# Patient Record
Sex: MALE | Race: WHITE | Employment: OTHER | URBAN - METROPOLITAN AREA
[De-identification: names, ages, dates, MRNs, and addresses within clinical notes are randomized per-mention and may not be internally consistent; named-entity substitution may affect disease eponyms.]

---

## 2018-09-26 ENCOUNTER — TELEPHONE (OUTPATIENT)
Dept: UROLOGY | Age: 83
End: 2018-09-26

## 2021-02-13 LAB — SARS-COV-2: DETECTED

## 2021-03-10 ENCOUNTER — APPOINTMENT (OUTPATIENT)
Dept: CT IMAGING | Age: 86
DRG: 299 | End: 2021-03-10
Payer: MEDICARE

## 2021-03-10 ENCOUNTER — APPOINTMENT (OUTPATIENT)
Dept: GENERAL RADIOLOGY | Age: 86
DRG: 299 | End: 2021-03-10
Payer: MEDICARE

## 2021-03-10 ENCOUNTER — HOSPITAL ENCOUNTER (INPATIENT)
Age: 86
LOS: 1 days | Discharge: ANOTHER HOSPITAL | DRG: 299 | End: 2021-03-11
Attending: EMERGENCY MEDICINE | Admitting: INTERNAL MEDICINE
Payer: MEDICARE

## 2021-03-10 DIAGNOSIS — I48.91 ATRIAL FIBRILLATION WITH RAPID VENTRICULAR RESPONSE (HCC): ICD-10-CM

## 2021-03-10 DIAGNOSIS — N39.0 URINARY TRACT INFECTION IN MALE: ICD-10-CM

## 2021-03-10 DIAGNOSIS — I73.9 PVD (PERIPHERAL VASCULAR DISEASE) (HCC): Primary | ICD-10-CM

## 2021-03-10 DIAGNOSIS — I99.8 ISCHEMIA OF LEFT LOWER EXTREMITY: ICD-10-CM

## 2021-03-10 LAB
ALBUMIN SERPL-MCNC: 2.8 G/DL (ref 3.5–5.1)
ALP BLD-CCNC: 67 U/L (ref 38–126)
ALT SERPL-CCNC: 8 U/L (ref 11–66)
ANION GAP SERPL CALCULATED.3IONS-SCNC: 11 MEQ/L (ref 8–16)
APTT: 34.2 SECONDS (ref 22–38)
APTT: 36.2 SECONDS (ref 22–38)
AST SERPL-CCNC: 18 U/L (ref 5–40)
BASOPHILS # BLD: 0.2 %
BASOPHILS ABSOLUTE: 0.1 THOU/MM3 (ref 0–0.1)
BILIRUB SERPL-MCNC: 1 MG/DL (ref 0.3–1.2)
BUN BLDV-MCNC: 23 MG/DL (ref 7–22)
CALCIUM SERPL-MCNC: 8.7 MG/DL (ref 8.5–10.5)
CHLORIDE BLD-SCNC: 105 MEQ/L (ref 98–111)
CO2: 19 MEQ/L (ref 23–33)
CREAT SERPL-MCNC: 1.2 MG/DL (ref 0.4–1.2)
EKG ATRIAL RATE: 110 BPM
EKG Q-T INTERVAL: 120 MS
EKG QRS DURATION: 38 MS
EKG QTC CALCULATION (BAZETT): 212 MS
EKG R AXIS: 0 DEGREES
EKG T AXIS: -107 DEGREES
EKG VENTRICULAR RATE: 189 BPM
EOSINOPHIL # BLD: 0.1 %
EOSINOPHILS ABSOLUTE: 0 THOU/MM3 (ref 0–0.4)
ERYTHROCYTE [DISTWIDTH] IN BLOOD BY AUTOMATED COUNT: 15 % (ref 11.5–14.5)
ERYTHROCYTE [DISTWIDTH] IN BLOOD BY AUTOMATED COUNT: 15 % (ref 11.5–14.5)
ERYTHROCYTE [DISTWIDTH] IN BLOOD BY AUTOMATED COUNT: 51.8 FL (ref 35–45)
ERYTHROCYTE [DISTWIDTH] IN BLOOD BY AUTOMATED COUNT: 53 FL (ref 35–45)
GFR SERPL CREATININE-BSD FRML MDRD: 57 ML/MIN/1.73M2
GLUCOSE BLD-MCNC: 133 MG/DL (ref 70–108)
HCT VFR BLD CALC: 37.9 % (ref 42–52)
HCT VFR BLD CALC: 39.3 % (ref 42–52)
HEMOGLOBIN: 11.7 GM/DL (ref 14–18)
HEMOGLOBIN: 11.9 GM/DL (ref 14–18)
IMMATURE GRANS (ABS): 0.4 THOU/MM3 (ref 0–0.07)
IMMATURE GRANULOCYTES: 1.4 %
INR BLD: 2.51 (ref 0.85–1.13)
LYMPHOCYTES # BLD: 3 %
LYMPHOCYTES ABSOLUTE: 0.9 THOU/MM3 (ref 1–4.8)
MCH RBC QN AUTO: 29.2 PG (ref 26–33)
MCH RBC QN AUTO: 29.3 PG (ref 26–33)
MCHC RBC AUTO-ENTMCNC: 30.3 GM/DL (ref 32.2–35.5)
MCHC RBC AUTO-ENTMCNC: 30.9 GM/DL (ref 32.2–35.5)
MCV RBC AUTO: 94.8 FL (ref 80–94)
MCV RBC AUTO: 96.3 FL (ref 80–94)
MONOCYTES # BLD: 8.4 %
MONOCYTES ABSOLUTE: 2.4 THOU/MM3 (ref 0.4–1.3)
NUCLEATED RED BLOOD CELLS: 0 /100 WBC
OSMOLALITY CALCULATION: 275.7 MOSMOL/KG (ref 275–300)
PLATELET # BLD: 191 THOU/MM3 (ref 130–400)
PLATELET # BLD: 199 THOU/MM3 (ref 130–400)
PLATELET ESTIMATE: ADEQUATE
PMV BLD AUTO: 9.6 FL (ref 9.4–12.4)
PMV BLD AUTO: 9.7 FL (ref 9.4–12.4)
POTASSIUM REFLEX MAGNESIUM: 5.5 MEQ/L (ref 3.5–5.2)
PRO-BNP: ABNORMAL PG/ML (ref 0–1800)
RBC # BLD: 4 MILL/MM3 (ref 4.7–6.1)
RBC # BLD: 4.08 MILL/MM3 (ref 4.7–6.1)
SCAN OF BLOOD SMEAR: NORMAL
SEG NEUTROPHILS: 86.9 %
SEGMENTED NEUTROPHILS ABSOLUTE COUNT: 24.8 THOU/MM3 (ref 1.8–7.7)
SODIUM BLD-SCNC: 135 MEQ/L (ref 135–145)
TOTAL PROTEIN: 7.3 G/DL (ref 6.1–8)
TROPONIN T: < 0.01 NG/ML
WBC # BLD: 25.1 THOU/MM3 (ref 4.8–10.8)
WBC # BLD: 28.5 THOU/MM3 (ref 4.8–10.8)

## 2021-03-10 PROCEDURE — 93005 ELECTROCARDIOGRAM TRACING: CPT | Performed by: EMERGENCY MEDICINE

## 2021-03-10 PROCEDURE — 6360000004 HC RX CONTRAST MEDICATION: Performed by: EMERGENCY MEDICINE

## 2021-03-10 PROCEDURE — 85730 THROMBOPLASTIN TIME PARTIAL: CPT

## 2021-03-10 PROCEDURE — 96360 HYDRATION IV INFUSION INIT: CPT

## 2021-03-10 PROCEDURE — 2580000003 HC RX 258: Performed by: NURSE PRACTITIONER

## 2021-03-10 PROCEDURE — 6370000000 HC RX 637 (ALT 250 FOR IP): Performed by: NURSE PRACTITIONER

## 2021-03-10 PROCEDURE — 2580000003 HC RX 258: Performed by: EMERGENCY MEDICINE

## 2021-03-10 PROCEDURE — 99449 NTRPROF PH1/NTRNET/EHR 31/>: CPT | Performed by: PSYCHIATRY & NEUROLOGY

## 2021-03-10 PROCEDURE — 85027 COMPLETE CBC AUTOMATED: CPT

## 2021-03-10 PROCEDURE — 1200000003 HC TELEMETRY R&B

## 2021-03-10 PROCEDURE — 80053 COMPREHEN METABOLIC PANEL: CPT

## 2021-03-10 PROCEDURE — 36415 COLL VENOUS BLD VENIPUNCTURE: CPT

## 2021-03-10 PROCEDURE — 70450 CT HEAD/BRAIN W/O DYE: CPT

## 2021-03-10 PROCEDURE — 96361 HYDRATE IV INFUSION ADD-ON: CPT

## 2021-03-10 PROCEDURE — 83880 ASSAY OF NATRIURETIC PEPTIDE: CPT

## 2021-03-10 PROCEDURE — 71045 X-RAY EXAM CHEST 1 VIEW: CPT

## 2021-03-10 PROCEDURE — 84484 ASSAY OF TROPONIN QUANT: CPT

## 2021-03-10 PROCEDURE — 99285 EMERGENCY DEPT VISIT HI MDM: CPT

## 2021-03-10 PROCEDURE — 85025 COMPLETE CBC W/AUTO DIFF WBC: CPT

## 2021-03-10 PROCEDURE — 75635 CT ANGIO ABDOMINAL ARTERIES: CPT

## 2021-03-10 PROCEDURE — 99223 1ST HOSP IP/OBS HIGH 75: CPT | Performed by: NURSE PRACTITIONER

## 2021-03-10 PROCEDURE — 85610 PROTHROMBIN TIME: CPT

## 2021-03-10 RX ORDER — WARFARIN SODIUM 4 MG/1
1 TABLET ORAL DAILY
Status: ON HOLD | COMMUNITY
Start: 2020-03-17 | End: 2021-03-15 | Stop reason: HOSPADM

## 2021-03-10 RX ORDER — ATORVASTATIN CALCIUM 20 MG/1
20 TABLET, FILM COATED ORAL DAILY
Status: DISCONTINUED | OUTPATIENT
Start: 2021-03-10 | End: 2021-03-11 | Stop reason: HOSPADM

## 2021-03-10 RX ORDER — ACETAMINOPHEN 650 MG/1
650 SUPPOSITORY RECTAL EVERY 6 HOURS PRN
Status: DISCONTINUED | OUTPATIENT
Start: 2021-03-10 | End: 2021-03-11 | Stop reason: HOSPADM

## 2021-03-10 RX ORDER — PROMETHAZINE HYDROCHLORIDE 25 MG/1
12.5 TABLET ORAL EVERY 6 HOURS PRN
Status: DISCONTINUED | OUTPATIENT
Start: 2021-03-10 | End: 2021-03-11 | Stop reason: HOSPADM

## 2021-03-10 RX ORDER — SODIUM CHLORIDE 0.9 % (FLUSH) 0.9 %
10 SYRINGE (ML) INJECTION PRN
Status: DISCONTINUED | OUTPATIENT
Start: 2021-03-10 | End: 2021-03-11 | Stop reason: HOSPADM

## 2021-03-10 RX ORDER — METOPROLOL SUCCINATE 50 MG/1
50 TABLET, EXTENDED RELEASE ORAL DAILY
Status: DISCONTINUED | OUTPATIENT
Start: 2021-03-11 | End: 2021-03-11 | Stop reason: HOSPADM

## 2021-03-10 RX ORDER — HEPARIN SODIUM 1000 [USP'U]/ML
80 INJECTION, SOLUTION INTRAVENOUS; SUBCUTANEOUS PRN
Status: DISCONTINUED | OUTPATIENT
Start: 2021-03-10 | End: 2021-03-11 | Stop reason: HOSPADM

## 2021-03-10 RX ORDER — ACETAMINOPHEN 325 MG/1
650 TABLET ORAL EVERY 6 HOURS PRN
Status: DISCONTINUED | OUTPATIENT
Start: 2021-03-10 | End: 2021-03-11 | Stop reason: HOSPADM

## 2021-03-10 RX ORDER — METOPROLOL SUCCINATE 50 MG/1
50 TABLET, EXTENDED RELEASE ORAL DAILY
COMMUNITY
Start: 2021-02-13

## 2021-03-10 RX ORDER — MIRTAZAPINE 7.5 MG/1
7.5 TABLET, FILM COATED ORAL NIGHTLY
COMMUNITY
Start: 2020-02-27

## 2021-03-10 RX ORDER — HEPARIN SODIUM 1000 [USP'U]/ML
80 INJECTION, SOLUTION INTRAVENOUS; SUBCUTANEOUS ONCE
Status: DISCONTINUED | OUTPATIENT
Start: 2021-03-10 | End: 2021-03-11 | Stop reason: HOSPADM

## 2021-03-10 RX ORDER — POLYETHYLENE GLYCOL 3350 17 G/17G
17 POWDER, FOR SOLUTION ORAL DAILY PRN
Status: DISCONTINUED | OUTPATIENT
Start: 2021-03-10 | End: 2021-03-11 | Stop reason: HOSPADM

## 2021-03-10 RX ORDER — SODIUM CHLORIDE 0.9 % (FLUSH) 0.9 %
10 SYRINGE (ML) INJECTION EVERY 12 HOURS SCHEDULED
Status: DISCONTINUED | OUTPATIENT
Start: 2021-03-10 | End: 2021-03-11 | Stop reason: HOSPADM

## 2021-03-10 RX ORDER — HEPARIN SODIUM 1000 [USP'U]/ML
40 INJECTION, SOLUTION INTRAVENOUS; SUBCUTANEOUS PRN
Status: DISCONTINUED | OUTPATIENT
Start: 2021-03-10 | End: 2021-03-11 | Stop reason: HOSPADM

## 2021-03-10 RX ORDER — ONDANSETRON 2 MG/ML
4 INJECTION INTRAMUSCULAR; INTRAVENOUS EVERY 6 HOURS PRN
Status: DISCONTINUED | OUTPATIENT
Start: 2021-03-10 | End: 2021-03-11 | Stop reason: HOSPADM

## 2021-03-10 RX ORDER — ASCORBIC ACID 500 MG
500 TABLET ORAL DAILY
COMMUNITY
Start: 2021-02-21

## 2021-03-10 RX ORDER — MIRTAZAPINE 15 MG/1
7.5 TABLET, FILM COATED ORAL NIGHTLY
Status: DISCONTINUED | OUTPATIENT
Start: 2021-03-10 | End: 2021-03-11 | Stop reason: HOSPADM

## 2021-03-10 RX ORDER — 0.9 % SODIUM CHLORIDE 0.9 %
250 INTRAVENOUS SOLUTION INTRAVENOUS ONCE
Status: COMPLETED | OUTPATIENT
Start: 2021-03-10 | End: 2021-03-10

## 2021-03-10 RX ORDER — HEPARIN SODIUM 10000 [USP'U]/100ML
5-30 INJECTION, SOLUTION INTRAVENOUS CONTINUOUS
Status: DISCONTINUED | OUTPATIENT
Start: 2021-03-10 | End: 2021-03-11 | Stop reason: HOSPADM

## 2021-03-10 RX ORDER — ASCORBIC ACID 500 MG
500 TABLET ORAL DAILY
Status: DISCONTINUED | OUTPATIENT
Start: 2021-03-11 | End: 2021-03-11 | Stop reason: HOSPADM

## 2021-03-10 RX ORDER — ATORVASTATIN CALCIUM 20 MG/1
20 TABLET, FILM COATED ORAL DAILY
COMMUNITY
Start: 2021-02-21

## 2021-03-10 RX ADMIN — IOPAMIDOL 80 ML: 755 INJECTION, SOLUTION INTRAVENOUS at 14:46

## 2021-03-10 RX ADMIN — MIRTAZAPINE 7.5 MG: 15 TABLET, FILM COATED ORAL at 20:01

## 2021-03-10 RX ADMIN — SODIUM CHLORIDE 250 ML: 9 INJECTION, SOLUTION INTRAVENOUS at 13:00

## 2021-03-10 RX ADMIN — ATORVASTATIN CALCIUM 20 MG: 20 TABLET, FILM COATED ORAL at 20:00

## 2021-03-10 RX ADMIN — SODIUM CHLORIDE, PRESERVATIVE FREE 10 ML: 5 INJECTION INTRAVENOUS at 20:01

## 2021-03-10 NOTE — ED PROVIDER NOTES
Signed out to me at shift change. This patient has been seen and evaluated by previous shift physician, Dr. Chasity Wall. Please refer to his/her note for detailed history, exam and MDM. Signed out time 3:07 PM    I was signed out to follow up CTA aortal run off. I have personally seen and evaluated this patient at time of sign out. Briefly this is a 80 y.o. male present to ED c/o Numbness (left lower leg)    Transfer from outside facility with concern of left lower extremity ischemia. Past medical history remarkable for atrial fibrillation on Coumadin anticoagulation. INR is 2.5. Patient was found to be in A. fib RVR on arrival with SBP around 80, patient was started on Cardizem drip. CTA aorta runoff is pending. Plan will admit. CT AA runoff results images are reviewed, significantly occluded multiple vessels starting from distal left lower extremity, with no contrast on left foot. CODE STATUS is DNR CC. Discussed with Dr. Robb Townsend on-call and we will admit him here. WBC 28.5, UA shows pyuria with negative nitrite. WBC could be due to left lower extremity ischemia. I will defer antibiotic choice to hospitalist and ID. Heparin drip is started. Extreme poor prognosis including gangrene of left lower leg is discussed and patient understands.      VITALS  Vitals:    03/10/21 1726 03/10/21 1945 03/11/21 0053 03/11/21 0332   BP: (!) 99/53 112/60 (!) 100/41 (!) 108/56   Pulse: 84 84 72 74   Resp: 20 20 18 18   Temp: 98.2 °F (36.8 °C) 98.7 °F (37.1 °C) 97.7 °F (36.5 °C) 98 °F (36.7 °C)   TempSrc: Oral Oral Oral Oral   SpO2: 94% 98% 97% 92%   Weight:    142 lb 1.6 oz (64.5 kg)   Height: 6' 2\" (1.88 m)            LABS  Results for orders placed or performed during the hospital encounter of 03/10/21   CBC Auto Differential   Result Value Ref Range    WBC 28.5 (H) 4.8 - 10.8 thou/mm3    RBC 4.00 (L) 4.70 - 6.10 mill/mm3    Hemoglobin 11.7 (L) 14.0 - 18.0 gm/dl    Hematocrit 37.9 (L) 42.0 - 52.0 %    MCV 94.8 (H) 80.0 - 94.0 fL    MCH 29.3 26.0 - 33.0 pg    MCHC 30.9 (L) 32.2 - 35.5 gm/dl    RDW-CV 15.0 (H) 11.5 - 14.5 %    RDW-SD 51.8 (H) 35.0 - 45.0 fL    Platelets 277 549 - 113 thou/mm3    MPV 9.7 9.4 - 12.4 fL    Seg Neutrophils 86.9 %    Lymphocytes 3.0 %    Monocytes 8.4 %    Eosinophils 0.1 %    Basophils 0.2 %    Immature Granulocytes 1.4 %    Platelet Estimate ADEQUATE Adequate    Segs Absolute 24.8 (H) 1 - 7 thou/mm3    Lymphocytes Absolute 0.9 (L) 1.0 - 4.8 thou/mm3    Monocytes Absolute 2.4 (H) 0.4 - 1.3 thou/mm3    Eosinophils Absolute 0.0 0.0 - 0.4 thou/mm3    Basophils Absolute 0.1 0.0 - 0.1 thou/mm3    Immature Grans (Abs) 0.40 (H) 0.00 - 0.07 thou/mm3    nRBC 0 /100 wbc   Comprehensive Metabolic Panel w/ Reflex to MG   Result Value Ref Range    Glucose 133 (H) 70 - 108 mg/dL    CREATININE 1.2 0.4 - 1.2 mg/dL    BUN 23 (H) 7 - 22 mg/dL    Sodium 135 135 - 145 meq/L    Potassium reflex Magnesium 5.5 (H) 3.5 - 5.2 meq/L    Chloride 105 98 - 111 meq/L    CO2 19 (L) 23 - 33 meq/L    Calcium 8.7 8.5 - 10.5 mg/dL    AST 18 5 - 40 U/L    Alkaline Phosphatase 67 38 - 126 U/L    Total Protein 7.3 6.1 - 8.0 g/dL    Albumin 2.8 (L) 3.5 - 5.1 g/dL    Total Bilirubin 1.0 0.3 - 1.2 mg/dL    ALT 8 (L) 11 - 66 U/L   Troponin   Result Value Ref Range    Troponin T < 0.010 ng/ml   Brain Natriuretic Peptide   Result Value Ref Range    Pro-BNP 32530.0 (H) 0.0 - 1800.0 pg/mL   Protime-INR   Result Value Ref Range    INR 2.51 (H) 0.85 - 1.13   Scan of Blood Smear   Result Value Ref Range    SCAN OF BLOOD SMEAR see below    Anion Gap   Result Value Ref Range    Anion Gap 11.0 8.0 - 16.0 meq/L   Osmolality   Result Value Ref Range    Osmolality Calc 275.7 275.0 - 300.0 mOsmol/kg   Glomerular Filtration Rate, Estimated   Result Value Ref Range    Est, Glom Filt Rate 57 (A) ml/min/1.73m2   CBC   Result Value Ref Range    WBC 25.1 (H) 4.8 - 10.8 thou/mm3    RBC 4.08 (L) 4.70 - 6.10 mill/mm3    Hemoglobin 11.9 (L) 14.0 electronically signed by Dr. Claudia Cassidy on 3/10/2021 3:16 PM      CTA ABDOMINAL AORTA W BILAT RUNOFF W WO CONTRAST   Final Result   1. Patient is status post prior aortobiiliac endograft stenting. 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 The left external iliac artery is also occluded. However there is reconstitution of flow at the left common femoral artery due to pelvic    collaterals. 2. There is a partially calcified mass within the rightward inferior urinary bladder on axial image 155. This is concerning for a bladder neoplasm. Recommend clinical correlation. 3. The left superficial femoral artery demonstrates multifocal areas of high-grade stenosis which are significantly flow-limiting. In addition, the left popliteal artery is very diminutive in caliber with multifocal areas of high-grade stenosis. There is    opacification of the tibial peroneal trunk. However there appears to be occlusion proximally and the anterior tibial artery appears occluded proximally. No runoff vessels are seen crossing the left ankle distally. 4. The right superficial femoral artery appears very diminutive in caliber demonstrating multifocal areas of high-grade stenosis. The distal right SFA appears occluded. 5. The proximal right popliteal artery also appears occluded. However there is reconstitution of flow by collateral vessels at its above-the-knee segment. The remaining popliteal artery is very diminutive in caliber. 6. There is three-vessel runoff initially on the right. However there is occlusion of the right anterior tibial artery distally. The dorsalis pedis artery is very small in caliber as it crosses the right ankle distally. 7. Limited evaluation of the lung bases demonstrates mild dependent right basilar airspace disease which may represent atelectasis or pneumonia.  There are also mild patchy groundglass opacities demonstrated at the visualized inferior lateral left upper    lobe

## 2021-03-10 NOTE — ED NOTES
Patient transferred to [facility] room 03. Nurse informed.       Subha Heath  03/10/21 3966

## 2021-03-10 NOTE — PROGRESS NOTES
Pharmacy Medication History Note      List of current medications patient is taking is complete. Source of information: [place], [place]    Changes made to medication list:  Medications removed (include reason, ex. therapy complete or physician discontinued):  none      Medications added/doses adjusted:  magnesium  Warfarin 4 mg daily  Mirtazapine  Atorvastatin  Metoprolol succinate  Hydro-gel  Phos-Nak  Vitamin C    Other notes (ex. Recent course of antibiotics, Coumadin dosing): Warfarin being dosed by [facility]. Prior home dose was warfarin 6mg MoFr and 4mg [id number]; however was decreased on 3/4 to warfarin 4mg daily  Denies use of other OTC or herbal medications.       Allergies reviewed      Electronically signed by Nataly Meng, [facility] on 3/10/2021 at 5:16 PM

## 2021-03-10 NOTE — H&P
History & Physical        Patient:  Parul Lund  YOB: 1934    MRN: [id number]     Katie: [de-identified]    PCP: No primary care provider on file. Date of Admission: 3/10/2021    Date of Service: Pt seen/examined on 03/10/21  and Admitted to Inpatient with expected LOS greater than two midnights due to medical therapy. ASSESSMENT/PLAN:    1. LLE weakness/numbness/ischemic left foot--[facility] from [facility] and initially wanted the patient transferred to [place] however  patient is a comfort care and [facility] was okay with a heparin drip and seeing him here and proceeding accordingly; I spoke with pharmacy and his INR is at 2.51 so we will hold on the heparin and recheck INR  2. Atrial Fib with RVR--on chronic Coumadin therapy with therapeutic INR; switched to Heparin gtt in ED; rate now controlled  3. Hypotension--resolved  4. Leukocytosis--afebrile  5. PVD--on chronic Coumadin therapy  6.  CODE STATUS--DNR comfort care; from [facility] home    Chief Complaint: Cold leg    History Of Present Illness:    80 y.o. male who presented to [address] [place] [address] with a cold left leg; patient has a past medical history of atrial fibrillation and is on chronic Coumadin therapy with a therapeutic INR; patient was transferred from [facility] secondary to left lower leg numbness and coldness, pulses are unable to be palpated on that left lower extremity; he denies any complaints, he denies any lightheadedness or dizziness, cough, chest pain, shortness of breath, nausea, his only complaint is numbness to that left lower leg; heparin drip was initiated in the emergency department, cardiovascular surgeon was contacted and initially when the patient transferred to [place] however on further communication via cardiothoracic surgeon and the ED physician it was decided to keep the patient here, initiate a heparin drip and proceed accordingly; he is being admitted to the hospital service for further care and evaluation. Past Medical History:      PVD, atrial fibrillation    Past Surgical History:      No past surgical history on file. Medications Prior to Admission:      Prior to Admission medications    Not on File       Allergies:  Patient has no known allergies. Social History:       Family History:      Positive as follows:    No family history on file. REVIEW OF SYSTEMS:     Constitutional: ROS: negative for - chills or fever  Head: no headache, no head injury, no migraine   Eyes ROS: denies blurred/double vision  Ears ROS: no hearing difficulty, no tinnitus  Mouth and Throat ROS: no ulceration, dysphagia, dental caries  Psychological ROS: no depression, no anxiety, no panic attacks, denies suicide/homicide ideation  Endocrine ROS: denies polyuria, polydypsia, no heat or cold intolerance  Respiratory ROS: no cough, shortness of breath, or wheezing  Cardiovascular ROS: no chest pain or dyspnea on exertion  Gastrointestinal ROS: no abdominal pain, change in bowel habits, or black or bloody stools  Genito-Urinary ROS: denies dysuria, frequency, urgency; denies hematuria  Musculoskeletal ROS: positive for -numbness to left lower leg  Neurological ROS: no syncope, no seizures, no numbness or tingling of hands, no numbness or tingling of feet, no paresis  Dermatology: no skin rash, no eczema  Endocrine: no polyuria, polydypsia, no heat/cold intolerance  Hematology: denies bruising easily, denies bleeding problems, denies clotting disorders    PHYSICAL EXAM:    BP (!) 97/52   Pulse 145   Temp 97.5 °F (36.4 °C) (Oral)   Resp 16   Ht 6' 2\" (1.88 m)   Wt 140 lb (63.5 kg)   SpO2 90%   BMI 17.97 kg/m²     General appearance:  No apparent distress, appears stated age and cooperative. HEENT:  Normal cephalic, atraumatic without obvious deformity. Pupils equal, round, and reactive to light. Conjunctivae/corneas clear. Neck: Supple, with full range of motion. No jugular venous distention.  Trachea midline. Respiratory:  Normal respiratory effort. Clear to auscultation, bilaterally without Rales/Wheezes/Rhonchi. Cardiovascular:  irregular rate and rhythm   Abdomen: Soft, non-tender, non-distended with normal bowel sounds. Musculoskeletal: Left lower leg and foot cold, unable to palpate or auscultate pulses  Skin: Skin color, texture, turgor normal.    Neurologic:  Neurovascularly intact without any focal sensory/motor deficits. Cranial nerves: II-XII intact, grossly non-focal.  Psychiatric:  Alert and oriented x 3, thought content appropriate  Capillary Refill: Brisk,< 3 seconds   Peripheral Pulses: Right pedal and posterior tibial are about a 1+, left pedal and posterior tibial are absent      Labs:     Recent Labs     03/10/21  1313   WBC 28.5*   HGB 11.7*   HCT 37.9*        Recent Labs     03/10/21  1313      K 5.5*      CO2 19*   BUN 23*   CREATININE 1.2   CALCIUM 8.7     Recent Labs     03/10/21  1313   AST 18   ALT 8*   BILITOT 1.0   ALKPHOS 67     Recent Labs     03/10/21  1313   INR 2.51*     Recent Labs     03/10/21  1313   TROPONINT < 0.010     Radiology:     Ct Head Wo Contrast    Result Date: 3/10/2021  PROCEDURE: CT HEAD WO CONTRAST CLINICAL INFORMATION: LLE WEAKNESS . COMPARISON: No prior study. TECHNIQUE: 2-D multiplanar noncontrast images of the brain All CT scans at this facility use dose modulation, iterative reconstruction, and/or weight-based dosing when appropriate to reduce radiation dose to as low as reasonably achievable. FINDINGS: Generalized cerebral volume loss. Remote left cerebellar infarct. Mild periventricular diminished white matter attenuation. Ventricles are normal. No hemorrhage or extra-axial collection. No acute infarction is seen. Calvarium is intact. This was paranasal sinuses and mastoid air cells are clear. No acute process. Chronic changes detailed above **This report has been created using voice recognition software.   It may contain minor errors which are inherent in voice recognition technology. ** Final report electronically signed by Dr. Antonietta Longo on 3/10/2021 1:07 PM    Cta Abdominal Aorta W Bilat Runoff W Wo Contrast    Result Date: 3/10/2021  PROCEDURE: CTA ABDOMINAL AORTA W BILAT RUNOFF W WO CONTRAST CLINICAL INFORMATION: LLE cold COMPARISON: None TECHNIQUE: 1.5 mm axial images were obtained through the chest after the administration of IV contrast.  A non-contrast localizer was obtained. 3D reconstructions were performed on the scanner to include oblique coronal MIP images through the abdomen, pelvis, lower extremities, and the lung bases. . All CT scans at this facility use dose modulation, iterative reconstruction, and/or weight-based dosing when appropriate to reduce radiation dose to as low as reasonably achievable. FINDINGS: Limited evaluation of the lung bases demonstrates mild dependent right basilar airspace disease which may represent atelectasis or pneumonia. There are also mild patchy groundglass opacities demonstrated at the visualized inferior lateral left upper lobe  which may also represent an inflammatory or infectious process. The liver, gallbladder, pancreas and adrenal glands appear normal. There is heterogeneous enhancement of the spleen, likely related to phase of contrast enhancement. There is nonspecific fluid demonstrated along the inferior pole of the right kidney. This may in part be related to motion. However, cannot exclude an exophytic cystic lesion. This is not well characterized on the current examination. No hydronephrosis or hydroureter is seen. There is no evidence of bowel obstruction. The appendix appears normal. There is no free air. There is a moderate amount retained stool within the rectum. No acute osseous findings are seen. Calcified plaques are demonstrated at the right lung base which is nonspecific. No acute osseous findings are seen.  However there is multilevel lumbar degenerative disc disease and lower lumbar facet arthrosis. Patient is status post prior aortobiiliac endograft stenting. 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 The left external iliac artery is also occluded. However there is reconstitution of flow at the left common femoral artery due to pelvic collaterals. There is a partially calcified mass within the rightward inferior urinary bladder on axial image 155. This is concerning for a bladder neoplasm. Recommend clinical correlation. The left superficial femoral artery demonstrates multifocal areas of high-grade stenosis which are significantly flow-limiting. In addition, the popliteal artery is very diminutive in caliber with multifocal areas of high-grade stenosis. There is opacification of the tibial peroneal trunk. However there appears to be occlusion of the tibial peroneal trunk proximally and the anterior tibial artery appears occluded proximally as well. The right iliac limb appears patent. The right external iliac artery and common femoral artery appear patent. The right superficial femoral artery appears very diminutive in caliber demonstrating multifocal areas of high-grade stenosis. The distal right SFA appears occluded. The proximal right popliteal artery also appears occluded. However there is reconstitution of flow by collateral vessels at its above-the-knee segment. The remaining popliteal artery is very diminutive in caliber. There is three-vessel runoff initially on the right. However there is occlusion of the right anterior tibial artery distally. The dorsalis pedis artery is very small in caliber as it crosses the right ankle distally. No lymphadenopathy is seen. There is no free air. There is no free fluid. There are small fluid collections demonstrated adjacent to the common femoral arteries bilaterally. These may represent postoperative seromas. 1.Patient is status post prior aortobiiliac endograft stenting. 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  The left external iliac artery is also occluded. However there is reconstitution of flow at the left common femoral artery due to pelvic collaterals. 2. There is a partially calcified mass within the rightward inferior urinary bladder on axial image 155. This is concerning for a bladder neoplasm. Recommend clinical correlation. 3. The left superficial femoral artery demonstrates multifocal areas of high-grade stenosis which are significantly flow-limiting. In addition, the left popliteal artery is very diminutive in caliber with multifocal areas of high-grade stenosis. There is  opacification of the tibial peroneal trunk. However there appears to be occlusion proximally and the anterior tibial artery appears occluded proximally. No runoff vessels are seen crossing the left ankle distally. 4. The right superficial femoral artery appears very diminutive in caliber demonstrating multifocal areas of high-grade stenosis. The distal right SFA appears occluded. 5. The proximal right popliteal artery also appears occluded. However there is reconstitution of flow by collateral vessels at its above-the-knee segment. The remaining popliteal artery is very diminutive in caliber. 6. There is three-vessel runoff initially on the right. However there is occlusion of the right anterior tibial artery distally. The dorsalis pedis artery is very small in caliber as it crosses the right ankle distally. 7. Limited evaluation of the lung bases demonstrates mild dependent right basilar airspace disease which may represent atelectasis or pneumonia. There are also mild patchy groundglass opacities demonstrated at the visualized inferior lateral left upper lobe which may also represent an inflammatory or infectious process. 8. There are small fluid collections demonstrated adjacent to the common femoral arteries bilaterally. These may represent postoperative seromas. **This report has been created using voice recognition software.   It may contain minor errors which are inherent in voice recognition technology. ** Final report electronically signed by Dr. Annika Green on 3/10/2021 3:26 PM    Xr Chest Portable    Result Date: 3/10/2021  PROCEDURE: XR CHEST PORTABLE CLINICAL INFORMATION: sob. COMPARISON: No prior study. TECHNIQUE: 2 AP radiographs of the chest.. FINDINGS: No pneumothorax. Blunting of the right costophrenic angle may represent atelectasis or consolidation. Increased parenchymal markings without focal area of consolidation is nonspecific. Mild cardiac prominence. No acute osseous abnormality. Mild prominent interstitial lung markings may represent infectious process or pulmonary edema. **This report has been created using voice recognition software. It may contain minor errors which are inherent in voice recognition technology. ** Final report electronically signed by Dr. Ashley Irwin on 3/10/2021 3:16 PM    Thank you No primary care provider on file. for the opportunity to be involved in this patient's care.     Electronically signed by FELIX Roberto CNP on 3/10/2021 at 4:29 PM

## 2021-03-10 NOTE — ED NOTES
ED to inpatient nurses report    Chief Complaint   Patient presents with    Numbness     left lower leg      Present to ED from nursing home  LOC: alert and orientated to name and place  Vital signs   Vitals:    03/10/21 1301 03/10/21 1350 03/10/21 1449 03/10/21 1559   BP: (!) 97/54 (!) 101/46 (!) 108/59 (!) 97/52   Pulse: 125 114 98 145   Resp: 20 18 18 16   Temp:       TempSrc:       SpO2: 91%  92% 90%   Weight: 140 lb (63.5 kg)      Height: 6' 2\" (1.88 m)         Oxygen Baseline room air    Current needs required room air Bipap/Cpap No  LDAs:   Peripheral IV 03/10/21 Left Forearm (Active)   Site Assessment Clean;Dry; Intact 03/10/21 1601   Line Status Normal saline locked 03/10/21 1601   Dressing Status Dry; Intact; Clean 03/10/21 1601     Mobility: Requires assistance * 2 not able to put weight on left leg  Pending ED orders: complete  Present condition: stable      Electronically signed by Blanca Gilliam RN on 3/10/2021 at 4:48 PM       Blanca Gilliam RN  03/10/21 [id number]

## 2021-03-10 NOTE — ED NOTES
Pt being sent [facility] for left lower leg numbness. Ara states left lower leg is cold and they are not able to find pulses.      Jez Monsalve RN  03/10/21 4246

## 2021-03-10 NOTE — VIRTUAL HEALTH
[facility] AT [place] Stroke and Vascular Neurology Consult for  [facility] Stroke Alert through [address] @ 1:30 pm  3/10/2021 1:37 PM  Pt Name: Enma Hubbard  MRN: [id number]  YOB: 1934  Date of evaluation: 3/10/2021  Primary Care Physician: No primary care provider on file. Reason for Evaluation: Stroke evaluation with Phone Consult, Discussion and Review of imaging    Enma Hubbard is a 80 y.o. male with past medical history including active fibrillation on Coumadin. Last known well at today 10 AM.  He presented with left leg weakness/numbness. His systolic blood pressure noted at 90s. His INR is elevated at 2.5. Allergies  has No Known Allergies. Medications  Prior to Admission medications    Not on File    Scheduled Meds:   sodium chloride  250 mL Intravenous Once     Continuous Infusions:  PRN Meds:.  Past Medical History   has no past medical history on file.   Social History  Social History     Socioeconomic History    Marital status: Not on file     Spouse name: Not on file    Number of children: Not on file    Years of education: Not on file    Highest education level: Not on file   Occupational History    Not on file   Social Needs    Financial resource strain: Not on file    Food insecurity     Worry: Not on file     Inability: Not on file    Transportation needs     Medical: Not on file     Non-medical: Not on file   Tobacco Use    Smoking status: Not on file   Substance and Sexual Activity    Alcohol use: Not on file    Drug use: Not on file    Sexual activity: Not on file   Lifestyle    Physical activity     Days per week: Not on file     Minutes per session: Not on file    Stress: Not on file   Relationships    Social connections     Talks on phone: Not on file     Gets together: Not on file     Attends Orthodoxy service: Not on file     Active member of club or organization: Not on file     Attends meetings of clubs or organizations: Not on file Relationship status: Not on file    Intimate partner violence     Fear of current or ex partner: Not on file     Emotionally abused: Not on file     Physically abused: Not on file     Forced sexual activity: Not on file   Other Topics Concern    Not on file   Social History Narrative    Not on file     Family History  No family history on file. OBJECTIVE  BP (!) 97/54   Pulse 125   Temp 97.5 °F (36.4 °C) (Oral)   Resp 20   Ht 6' 2\" (1.88 m)   Wt 140 lb (63.5 kg)   SpO2 91%   BMI 17.97 kg/m²     NIH Stroke Scale  Level of Consciousness (1a. ): Alert  LOC Questions (1b. ): Answers both correctly  LOC Commands (1c. ): Performs both tasks correctly  Best Gaze (2. ): Normal  Visual (3. ): No visual loss  Facial Palsy (4. ): Normal symmetrical movement  Motor Arm, Left (5a. ): No drift  Motor Arm, Right (5b. ): No drift  Motor Leg, Left (6a. ): Some effort against gravity(states it causes pain)  Motor Leg, Right (6b. ): No drift  Limb Ataxia (7. ): Absent  Sensory (8. ): Normal  Best Language (9. ): No aphasia  Dysarthria (10. ): Normal  Extinction and Inattention (11): No abnormality  Total: 2      Imaging:  Images were personally reviewed with KENYON NOLASCO used to review images including:  CT brain without contrast: No acute bleeding noted. Assessment    Differential DDx:  1. Acute ischemic stroke. Recommendations:  1. NIH 2  2. Recommend Inpatient Neurology Consult for further assessment and evaluation   3. Give aspirin 325 mg now. 4. IV hydration. 5. Allow specific blood pressure up to 220.  6. No TPA as patient on Coumadin, elevated INR. 7. Obtain CTA head and neck. Discussed with ED Physician    This is a Phone Consult, I have not seen the patient face to face, the telemedicine device was not utilized.     Anna Morrison MD   Stroke, Neurocritical Care And/or [address] [place] [address] Stroke [facility] [place]  Electronically signed 3/10/2021 at 1:37 PM

## 2021-03-10 NOTE — PROGRESS NOTES
Pt admitted to  [address] [place] [address] from ED. Complaints: PVD, \"cold left leg\". IV none infusing into the forearm left, condition patent and no redness. IV site free of s/s of infection or infiltration. Vital signs obtained. Assessment and data collection initiated. Two nurse skin assessment performed by Lulú Stein RN and Barry Christie RN. Oriented to room. Policies and procedures for [facility] explained. Lulú Stein RN discussed hourly rounding with patient addressing 5 P's. Fall prevention and safety brochure discussed with patient. Bed alarm on. Call light in reach. The best day to schedule a follow up Dr appointment is:  Tuesday p.m. Explained patients right to have family, representative or physician notified of their admission. Patient has Declined for physician to be notified. Patient has Declined for family/representative to be notified. All questions answered with no further questions at this time.

## 2021-03-11 ENCOUNTER — HOSPITAL ENCOUNTER (INPATIENT)
Age: 86
LOS: 4 days | Discharge: NURSING, REHAB OR LONG-TERM CARE | DRG: 270 | End: 2021-03-15
Attending: SURGERY | Admitting: SURGERY
Payer: MEDICARE

## 2021-03-11 ENCOUNTER — ANESTHESIA (OUTPATIENT)
Dept: OPERATING ROOM | Age: 86
DRG: 270 | End: 2021-03-11
Payer: MEDICARE

## 2021-03-11 ENCOUNTER — APPOINTMENT (OUTPATIENT)
Dept: GENERAL RADIOLOGY | Age: 86
DRG: 270 | End: 2021-03-11
Attending: SURGERY
Payer: MEDICARE

## 2021-03-11 ENCOUNTER — ANESTHESIA EVENT (OUTPATIENT)
Dept: OPERATING ROOM | Age: 86
DRG: 270 | End: 2021-03-11
Payer: MEDICARE

## 2021-03-11 VITALS
BODY MASS INDEX: 18.24 KG/M2 | WEIGHT: 142.1 LBS | RESPIRATION RATE: 17 BRPM | TEMPERATURE: 97.5 F | HEART RATE: 104 BPM | HEIGHT: 74 IN | DIASTOLIC BLOOD PRESSURE: 49 MMHG | OXYGEN SATURATION: 93 % | SYSTOLIC BLOOD PRESSURE: 99 MMHG

## 2021-03-11 VITALS — DIASTOLIC BLOOD PRESSURE: 88 MMHG | TEMPERATURE: 99 F | SYSTOLIC BLOOD PRESSURE: 113 MMHG | OXYGEN SATURATION: 80 %

## 2021-03-11 PROBLEM — E43 SEVERE MALNUTRITION (HCC): Chronic | Status: ACTIVE | Noted: 2021-03-11

## 2021-03-11 PROBLEM — I99.8 ISCHEMIC LEG: Status: ACTIVE | Noted: 2021-03-11

## 2021-03-11 LAB
ABSOLUTE EOS #: 0.24 K/UL (ref 0–0.4)
ABSOLUTE IMMATURE GRANULOCYTE: 0.12 K/UL (ref 0–0.3)
ABSOLUTE LYMPH #: 0.83 K/UL (ref 1–4.8)
ABSOLUTE MONO #: 1.18 K/UL (ref 0.1–0.8)
ALLEN TEST: ABNORMAL
ANION GAP SERPL CALCULATED.3IONS-SCNC: 6 MMOL/L (ref 9–17)
APTT: 36.6 SECONDS (ref 22–38)
APTT: 37.6 SECONDS (ref 22–38)
BACTERIA: ABNORMAL /HPF
BASOPHILS # BLD: 1 % (ref 0–2)
BASOPHILS ABSOLUTE: 0.12 K/UL (ref 0–0.2)
BILIRUBIN URINE: NEGATIVE
BLOOD, URINE: ABNORMAL
BUN BLDV-MCNC: 27 MG/DL (ref 8–23)
BUN/CREAT BLD: ABNORMAL (ref 9–20)
CALCIUM IONIZED: 1.1 MMOL/L (ref 1.13–1.33)
CALCIUM IONIZED: 1.15 MMOL/L (ref 1.13–1.33)
CALCIUM SERPL-MCNC: 8.2 MG/DL (ref 8.6–10.4)
CARBOXYHEMOGLOBIN: 0.8 % (ref 0–5)
CASTS 2: ABNORMAL /LPF
CASTS UA: ABNORMAL /LPF
CHARACTER, URINE: ABNORMAL
CHLORIDE BLD-SCNC: 105 MMOL/L (ref 98–107)
CHLORIDE, WHOLE BLOOD: 112 MMOL/L (ref 98–110)
CO2: 23 MMOL/L (ref 20–31)
COLOR: YELLOW
CREAT SERPL-MCNC: 0.93 MG/DL (ref 0.7–1.2)
CRYSTALS, UA: ABNORMAL
DIFFERENTIAL TYPE: ABNORMAL
EOSINOPHILS RELATIVE PERCENT: 2 % (ref 1–4)
EPITHELIAL CELLS, UA: ABNORMAL /HPF
FIO2: ABNORMAL
GFR AFRICAN AMERICAN: >60 ML/MIN
GFR NON-AFRICAN AMERICAN: >60 ML/MIN
GFR SERPL CREATININE-BSD FRML MDRD: ABNORMAL ML/MIN/{1.73_M2}
GFR SERPL CREATININE-BSD FRML MDRD: ABNORMAL ML/MIN/{1.73_M2}
GLUCOSE BLD-MCNC: 117 MG/DL (ref 70–99)
GLUCOSE BLD-MCNC: 135 MG/DL (ref 75–110)
GLUCOSE URINE: NEGATIVE MG/DL
HCO3 ARTERIAL: 22.8 MMOL/L (ref 22–27)
HCT VFR BLD CALC: 31.4 % (ref 40.7–50.3)
HCT VFR BLD CALC: 33.7 %
HEMOGLOBIN: 10.1 G/DL (ref 13–17)
HEMOGLOBIN: 10.9 GM/DL
IMMATURE GRANULOCYTES: 1 %
INR BLD: 2.23 (ref 0.85–1.13)
KETONES, URINE: NEGATIVE
LEUKOCYTE ESTERASE, URINE: ABNORMAL
LYMPHOCYTES # BLD: 7 % (ref 24–44)
MAGNESIUM: 1.5 MG/DL (ref 1.6–2.6)
MCH RBC QN AUTO: 29.2 PG (ref 25.2–33.5)
MCHC RBC AUTO-ENTMCNC: 32.2 G/DL (ref 28.4–34.8)
MCV RBC AUTO: 90.8 FL (ref 82.6–102.9)
METHEMOGLOBIN: ABNORMAL % (ref 0–1.5)
MISCELLANEOUS 2: ABNORMAL
MODE: ABNORMAL
MONOCYTES # BLD: 10 % (ref 1–7)
MORPHOLOGY: ABNORMAL
MYOGLOBIN: 558 NG/ML (ref 28–72)
NEGATIVE BASE EXCESS, ART: 2.5 MMOL/L (ref 0–2)
NITRITE, URINE: NEGATIVE
NOTIFICATION TIME: ABNORMAL
NOTIFICATION: ABNORMAL
NRBC AUTOMATED: 0 PER 100 WBC
O2 DEVICE/FLOW/%: ABNORMAL
O2 SAT, ARTERIAL: 99 % (ref 94–100)
OXYHEMOGLOBIN: ABNORMAL % (ref 95–98)
PARTIAL THROMBOPLASTIN TIME: 84.6 SEC (ref 20.5–30.5)
PATIENT TEMP: 37.4
PCO2 ARTERIAL: 43.8 MMHG (ref 32–45)
PCO2, ART, TEMP ADJ: 44.6 (ref 32–45)
PDW BLD-RTO: 15.2 % (ref 11.8–14.4)
PEEP/CPAP: ABNORMAL
PH ARTERIAL: 7.34 (ref 7.35–7.45)
PH UA: 6 (ref 5–9)
PH, ART, TEMP ADJ: 7.33 (ref 7.35–7.45)
PHOSPHORUS: 2.5 MG/DL (ref 2.5–4.5)
PLATELET # BLD: 182 K/UL (ref 138–453)
PLATELET ESTIMATE: ABNORMAL
PMV BLD AUTO: 9.8 FL (ref 8.1–13.5)
PO2 ARTERIAL: 203 MMHG (ref 75–95)
PO2, ART, TEMP ADJ: 205 MMHG (ref 75–95)
POSITIVE BASE EXCESS, ART: ABNORMAL MMOL/L (ref 0–2)
POTASSIUM SERPL-SCNC: 4.3 MMOL/L (ref 3.7–5.3)
POTASSIUM, WHOLE BLOOD: 3.9 MMOL/L (ref 3.6–5)
PROTEIN UA: 30
PSV: ABNORMAL
PT. POSITION: ABNORMAL
RBC # BLD: 3.46 M/UL (ref 4.21–5.77)
RBC # BLD: ABNORMAL 10*6/UL
RBC URINE: ABNORMAL /HPF
RENAL EPITHELIAL, UA: ABNORMAL
RESPIRATORY RATE: ABNORMAL
SAMPLE SITE: ABNORMAL
SARS-COV-2, RAPID: DETECTED
SEG NEUTROPHILS: 79 % (ref 36–66)
SEGMENTED NEUTROPHILS ABSOLUTE COUNT: 9.31 K/UL (ref 1.8–7.7)
SET RATE: ABNORMAL
SODIUM BLD-SCNC: 134 MMOL/L (ref 135–144)
SODIUM, WHOLE BLOOD: 138 MMOL/L (ref 136–145)
SPECIFIC GRAVITY, URINE: 1.02 (ref 1–1.03)
SPECIMEN DESCRIPTION: ABNORMAL
TEXT FOR RESPIRATORY: ABNORMAL
TOTAL CK: 1250 U/L (ref 39–308)
TOTAL HB: ABNORMAL G/DL (ref 12–16)
TOTAL RATE: ABNORMAL
UROBILINOGEN, URINE: 0.2 EU/DL (ref 0–1)
VT: ABNORMAL
WBC # BLD: 11.8 K/UL (ref 3.5–11.3)
WBC # BLD: ABNORMAL 10*3/UL
WBC UA: > 200 /HPF
YEAST: ABNORMAL

## 2021-03-11 PROCEDURE — 99223 1ST HOSP IP/OBS HIGH 75: CPT | Performed by: SURGERY

## 2021-03-11 PROCEDURE — 04CJ0ZZ EXTIRPATION OF MATTER FROM LEFT EXTERNAL ILIAC ARTERY, OPEN APPROACH: ICD-10-PCS | Performed by: SURGERY

## 2021-03-11 PROCEDURE — 04UF0JZ SUPPLEMENT LEFT INTERNAL ILIAC ARTERY WITH SYNTHETIC SUBSTITUTE, OPEN APPROACH: ICD-10-PCS | Performed by: SURGERY

## 2021-03-11 PROCEDURE — C1725 CATH, TRANSLUMIN NON-LASER: HCPCS

## 2021-03-11 PROCEDURE — 04CL0ZZ EXTIRPATION OF MATTER FROM LEFT FEMORAL ARTERY, OPEN APPROACH: ICD-10-PCS | Performed by: SURGERY

## 2021-03-11 PROCEDURE — 3600000014 HC SURGERY LEVEL 4 ADDTL 15MIN: Performed by: SURGERY

## 2021-03-11 PROCEDURE — 2780000010 HC IMPLANT OTHER: Performed by: SURGERY

## 2021-03-11 PROCEDURE — 34201 REMOVAL OF ARTERY CLOT: CPT | Performed by: SURGERY

## 2021-03-11 PROCEDURE — 93005 ELECTROCARDIOGRAM TRACING: CPT | Performed by: STUDENT IN AN ORGANIZED HEALTH CARE EDUCATION/TRAINING PROGRAM

## 2021-03-11 PROCEDURE — 6360000002 HC RX W HCPCS: Performed by: NURSE ANESTHETIST, CERTIFIED REGISTERED

## 2021-03-11 PROCEDURE — 2580000003 HC RX 258: Performed by: STUDENT IN AN ORGANIZED HEALTH CARE EDUCATION/TRAINING PROGRAM

## 2021-03-11 PROCEDURE — 2580000003 HC RX 258: Performed by: SURGERY

## 2021-03-11 PROCEDURE — 83874 ASSAY OF MYOGLOBIN: CPT

## 2021-03-11 PROCEDURE — C1757 CATH, THROMBECTOMY/EMBOLECT: HCPCS | Performed by: SURGERY

## 2021-03-11 PROCEDURE — 6360000002 HC RX W HCPCS: Performed by: NURSE PRACTITIONER

## 2021-03-11 PROCEDURE — 3600000004 HC SURGERY LEVEL 4 BASE: Performed by: SURGERY

## 2021-03-11 PROCEDURE — 2580000003 HC RX 258: Performed by: NURSE PRACTITIONER

## 2021-03-11 PROCEDURE — 94761 N-INVAS EAR/PLS OXIMETRY MLT: CPT

## 2021-03-11 PROCEDURE — 84100 ASSAY OF PHOSPHORUS: CPT

## 2021-03-11 PROCEDURE — 2500000003 HC RX 250 WO HCPCS: Performed by: NURSE ANESTHETIST, CERTIFIED REGISTERED

## 2021-03-11 PROCEDURE — 85018 HEMOGLOBIN: CPT

## 2021-03-11 PROCEDURE — 6360000002 HC RX W HCPCS: Performed by: STUDENT IN AN ORGANIZED HEALTH CARE EDUCATION/TRAINING PROGRAM

## 2021-03-11 PROCEDURE — 2500000003 HC RX 250 WO HCPCS: Performed by: STUDENT IN AN ORGANIZED HEALTH CARE EDUCATION/TRAINING PROGRAM

## 2021-03-11 PROCEDURE — 86850 RBC ANTIBODY SCREEN: CPT

## 2021-03-11 PROCEDURE — 85730 THROMBOPLASTIN TIME PARTIAL: CPT

## 2021-03-11 PROCEDURE — 80048 BASIC METABOLIC PNL TOTAL CA: CPT

## 2021-03-11 PROCEDURE — 6370000000 HC RX 637 (ALT 250 FOR IP): Performed by: NURSE PRACTITIONER

## 2021-03-11 PROCEDURE — 86901 BLOOD TYPING SEROLOGIC RH(D): CPT

## 2021-03-11 PROCEDURE — 82330 ASSAY OF CALCIUM: CPT

## 2021-03-11 PROCEDURE — 84132 ASSAY OF SERUM POTASSIUM: CPT

## 2021-03-11 PROCEDURE — 81001 URINALYSIS AUTO W/SCOPE: CPT

## 2021-03-11 PROCEDURE — 87186 SC STD MICRODIL/AGAR DIL: CPT

## 2021-03-11 PROCEDURE — 2709999900 HC NON-CHARGEABLE SUPPLY: Performed by: SURGERY

## 2021-03-11 PROCEDURE — 87086 URINE CULTURE/COLONY COUNT: CPT

## 2021-03-11 PROCEDURE — 86920 COMPATIBILITY TEST SPIN: CPT

## 2021-03-11 PROCEDURE — APPSS60 APP SPLIT SHARED TIME 46-60 MINUTES: Performed by: PHYSICIAN ASSISTANT

## 2021-03-11 PROCEDURE — 36415 COLL VENOUS BLD VENIPUNCTURE: CPT

## 2021-03-11 PROCEDURE — 87077 CULTURE AEROBIC IDENTIFY: CPT

## 2021-03-11 PROCEDURE — 85025 COMPLETE CBC W/AUTO DIFF WBC: CPT

## 2021-03-11 PROCEDURE — 3700000000 HC ANESTHESIA ATTENDED CARE: Performed by: SURGERY

## 2021-03-11 PROCEDURE — 2500000003 HC RX 250 WO HCPCS

## 2021-03-11 PROCEDURE — U0002 COVID-19 LAB TEST NON-CDC: HCPCS

## 2021-03-11 PROCEDURE — 99223 1ST HOSP IP/OBS HIGH 75: CPT | Performed by: THORACIC SURGERY (CARDIOTHORACIC VASCULAR SURGERY)

## 2021-03-11 PROCEDURE — 86900 BLOOD TYPING SEROLOGIC ABO: CPT

## 2021-03-11 PROCEDURE — 2580000003 HC RX 258: Performed by: NURSE ANESTHETIST, CERTIFIED REGISTERED

## 2021-03-11 PROCEDURE — 6360000004 HC RX CONTRAST MEDICATION: Performed by: SURGERY

## 2021-03-11 PROCEDURE — 85014 HEMATOCRIT: CPT

## 2021-03-11 PROCEDURE — C1769 GUIDE WIRE: HCPCS

## 2021-03-11 PROCEDURE — 82805 BLOOD GASES W/O2 SATURATION: CPT

## 2021-03-11 PROCEDURE — 99239 HOSP IP/OBS DSCHRG MGMT >30: CPT | Performed by: INTERNAL MEDICINE

## 2021-03-11 PROCEDURE — 71045 X-RAY EXAM CHEST 1 VIEW: CPT

## 2021-03-11 PROCEDURE — C1894 INTRO/SHEATH, NON-LASER: HCPCS | Performed by: SURGERY

## 2021-03-11 PROCEDURE — C1887 CATHETER, GUIDING: HCPCS

## 2021-03-11 PROCEDURE — 6370000000 HC RX 637 (ALT 250 FOR IP): Performed by: SURGERY

## 2021-03-11 PROCEDURE — 2000000000 HC ICU R&B

## 2021-03-11 PROCEDURE — 85610 PROTHROMBIN TIME: CPT

## 2021-03-11 PROCEDURE — 82550 ASSAY OF CK (CPK): CPT

## 2021-03-11 PROCEDURE — 6360000002 HC RX W HCPCS: Performed by: SURGERY

## 2021-03-11 PROCEDURE — 34710 DLYD PLMT XTN PROSTH 1ST VSL: CPT | Performed by: SURGERY

## 2021-03-11 PROCEDURE — C1894 INTRO/SHEATH, NON-LASER: HCPCS

## 2021-03-11 PROCEDURE — 3700000001 HC ADD 15 MINUTES (ANESTHESIA): Performed by: SURGERY

## 2021-03-11 PROCEDURE — 83735 ASSAY OF MAGNESIUM: CPT

## 2021-03-11 DEVICE — IMPLANTABLE DEVICE: Type: IMPLANTABLE DEVICE | Site: GROIN | Status: FUNCTIONAL

## 2021-03-11 RX ORDER — ETOMIDATE 2 MG/ML
INJECTION INTRAVENOUS PRN
Status: DISCONTINUED | OUTPATIENT
Start: 2021-03-11 | End: 2021-03-11 | Stop reason: SDUPTHER

## 2021-03-11 RX ORDER — CALCIUM GLUCONATE 20 MG/ML
1000 INJECTION, SOLUTION INTRAVENOUS ONCE
Status: COMPLETED | OUTPATIENT
Start: 2021-03-11 | End: 2021-03-12

## 2021-03-11 RX ORDER — HEPARIN SODIUM 1000 [USP'U]/ML
INJECTION, SOLUTION INTRAVENOUS; SUBCUTANEOUS PRN
Status: DISCONTINUED | OUTPATIENT
Start: 2021-03-11 | End: 2021-03-11 | Stop reason: SDUPTHER

## 2021-03-11 RX ORDER — FENTANYL CITRATE 50 UG/ML
INJECTION, SOLUTION INTRAMUSCULAR; INTRAVENOUS PRN
Status: DISCONTINUED | OUTPATIENT
Start: 2021-03-11 | End: 2021-03-11 | Stop reason: SDUPTHER

## 2021-03-11 RX ORDER — METOPROLOL TARTRATE 5 MG/5ML
INJECTION INTRAVENOUS PRN
Status: DISCONTINUED | OUTPATIENT
Start: 2021-03-11 | End: 2021-03-11 | Stop reason: SDUPTHER

## 2021-03-11 RX ORDER — ONDANSETRON 2 MG/ML
INJECTION INTRAMUSCULAR; INTRAVENOUS PRN
Status: DISCONTINUED | OUTPATIENT
Start: 2021-03-11 | End: 2021-03-11 | Stop reason: SDUPTHER

## 2021-03-11 RX ORDER — NOREPINEPHRINE BIT/0.9 % NACL 16MG/250ML
INFUSION BOTTLE (ML) INTRAVENOUS
Status: COMPLETED
Start: 2021-03-11 | End: 2021-03-11

## 2021-03-11 RX ORDER — ONDANSETRON 2 MG/ML
4 INJECTION INTRAMUSCULAR; INTRAVENOUS
Status: DISCONTINUED | OUTPATIENT
Start: 2021-03-11 | End: 2021-03-11 | Stop reason: HOSPADM

## 2021-03-11 RX ORDER — FENTANYL CITRATE 50 UG/ML
50 INJECTION, SOLUTION INTRAMUSCULAR; INTRAVENOUS
Status: DISCONTINUED | OUTPATIENT
Start: 2021-03-11 | End: 2021-03-12

## 2021-03-11 RX ORDER — FENTANYL CITRATE 50 UG/ML
50 INJECTION, SOLUTION INTRAMUSCULAR; INTRAVENOUS EVERY 5 MIN PRN
Status: DISCONTINUED | OUTPATIENT
Start: 2021-03-11 | End: 2021-03-11 | Stop reason: HOSPADM

## 2021-03-11 RX ORDER — OXYCODONE HYDROCHLORIDE 5 MG/1
5 TABLET ORAL EVERY 4 HOURS PRN
Status: DISCONTINUED | OUTPATIENT
Start: 2021-03-11 | End: 2021-03-14

## 2021-03-11 RX ORDER — NOREPINEPHRINE BIT/0.9 % NACL 16MG/250ML
INFUSION BOTTLE (ML) INTRAVENOUS
Status: DISCONTINUED
Start: 2021-03-11 | End: 2021-03-12

## 2021-03-11 RX ORDER — SODIUM CHLORIDE 9 MG/ML
INJECTION, SOLUTION INTRAVENOUS CONTINUOUS PRN
Status: DISCONTINUED | OUTPATIENT
Start: 2021-03-11 | End: 2021-03-11 | Stop reason: SDUPTHER

## 2021-03-11 RX ORDER — ACETAMINOPHEN 500 MG
1000 TABLET ORAL EVERY 8 HOURS SCHEDULED
Status: DISCONTINUED | OUTPATIENT
Start: 2021-03-11 | End: 2021-03-15 | Stop reason: HOSPADM

## 2021-03-11 RX ORDER — HEPARIN SODIUM 10000 [USP'U]/100ML
5-30 INJECTION, SOLUTION INTRAVENOUS CONTINUOUS
Status: DISCONTINUED | OUTPATIENT
Start: 2021-03-11 | End: 2021-03-12

## 2021-03-11 RX ORDER — IODIXANOL 320 MG/ML
INJECTION, SOLUTION INTRAVASCULAR PRN
Status: DISCONTINUED | OUTPATIENT
Start: 2021-03-11 | End: 2021-03-11 | Stop reason: ALTCHOICE

## 2021-03-11 RX ORDER — SODIUM CHLORIDE 0.9 % (FLUSH) 0.9 %
10 SYRINGE (ML) INJECTION PRN
Status: DISCONTINUED | OUTPATIENT
Start: 2021-03-11 | End: 2021-03-15 | Stop reason: HOSPADM

## 2021-03-11 RX ORDER — IODIXANOL 320 MG/ML
INJECTION, SOLUTION INTRAVASCULAR
Status: DISCONTINUED
Start: 2021-03-11 | End: 2021-03-11

## 2021-03-11 RX ORDER — SODIUM CHLORIDE 0.9 % (FLUSH) 0.9 %
10 SYRINGE (ML) INJECTION EVERY 12 HOURS SCHEDULED
Status: DISCONTINUED | OUTPATIENT
Start: 2021-03-11 | End: 2021-03-15 | Stop reason: HOSPADM

## 2021-03-11 RX ORDER — HEPARIN SODIUM 10000 [USP'U]/100ML
INJECTION, SOLUTION INTRAVENOUS CONTINUOUS PRN
Status: DISCONTINUED | OUTPATIENT
Start: 2021-03-11 | End: 2021-03-11 | Stop reason: SDUPTHER

## 2021-03-11 RX ORDER — ROCURONIUM BROMIDE 10 MG/ML
INJECTION, SOLUTION INTRAVENOUS PRN
Status: DISCONTINUED | OUTPATIENT
Start: 2021-03-11 | End: 2021-03-11 | Stop reason: SDUPTHER

## 2021-03-11 RX ORDER — HEPARIN SODIUM 1000 [USP'U]/ML
40 INJECTION, SOLUTION INTRAVENOUS; SUBCUTANEOUS PRN
Status: DISCONTINUED | OUTPATIENT
Start: 2021-03-11 | End: 2021-03-12

## 2021-03-11 RX ORDER — CEFAZOLIN SODIUM 1 G/3ML
INJECTION, POWDER, FOR SOLUTION INTRAMUSCULAR; INTRAVENOUS PRN
Status: DISCONTINUED | OUTPATIENT
Start: 2021-03-11 | End: 2021-03-11 | Stop reason: SDUPTHER

## 2021-03-11 RX ORDER — IPRATROPIUM BROMIDE AND ALBUTEROL SULFATE 2.5; .5 MG/3ML; MG/3ML
1 SOLUTION RESPIRATORY (INHALATION) EVERY 4 HOURS PRN
Status: DISCONTINUED | OUTPATIENT
Start: 2021-03-11 | End: 2021-03-15 | Stop reason: HOSPADM

## 2021-03-11 RX ORDER — MAGNESIUM SULFATE 1 G/100ML
1000 INJECTION INTRAVENOUS PRN
Status: DISCONTINUED | OUTPATIENT
Start: 2021-03-11 | End: 2021-03-12

## 2021-03-11 RX ORDER — NEOSTIGMINE METHYLSULFATE 5 MG/5 ML
SYRINGE (ML) INTRAVENOUS PRN
Status: DISCONTINUED | OUTPATIENT
Start: 2021-03-11 | End: 2021-03-11 | Stop reason: SDUPTHER

## 2021-03-11 RX ORDER — FENTANYL CITRATE 50 UG/ML
25 INJECTION, SOLUTION INTRAMUSCULAR; INTRAVENOUS EVERY 5 MIN PRN
Status: DISCONTINUED | OUTPATIENT
Start: 2021-03-11 | End: 2021-03-11 | Stop reason: HOSPADM

## 2021-03-11 RX ORDER — ONDANSETRON 2 MG/ML
4 INJECTION INTRAMUSCULAR; INTRAVENOUS EVERY 6 HOURS PRN
Status: DISCONTINUED | OUTPATIENT
Start: 2021-03-11 | End: 2021-03-15 | Stop reason: HOSPADM

## 2021-03-11 RX ORDER — HEPARIN SODIUM 1000 [USP'U]/ML
INJECTION, SOLUTION INTRAVENOUS; SUBCUTANEOUS
Status: DISCONTINUED
Start: 2021-03-11 | End: 2021-03-11

## 2021-03-11 RX ORDER — POTASSIUM CHLORIDE 7.45 MG/ML
10 INJECTION INTRAVENOUS PRN
Status: DISCONTINUED | OUTPATIENT
Start: 2021-03-11 | End: 2021-03-12

## 2021-03-11 RX ORDER — METOPROLOL TARTRATE 5 MG/5ML
5 INJECTION INTRAVENOUS EVERY 6 HOURS PRN
Status: DISCONTINUED | OUTPATIENT
Start: 2021-03-11 | End: 2021-03-15 | Stop reason: HOSPADM

## 2021-03-11 RX ORDER — METOPROLOL TARTRATE 5 MG/5ML
5 INJECTION INTRAVENOUS EVERY 6 HOURS
Status: DISCONTINUED | OUTPATIENT
Start: 2021-03-11 | End: 2021-03-11

## 2021-03-11 RX ORDER — LIDOCAINE HYDROCHLORIDE 10 MG/ML
INJECTION, SOLUTION EPIDURAL; INFILTRATION; INTRACAUDAL; PERINEURAL PRN
Status: DISCONTINUED | OUTPATIENT
Start: 2021-03-11 | End: 2021-03-11 | Stop reason: SDUPTHER

## 2021-03-11 RX ORDER — SODIUM CHLORIDE, SODIUM LACTATE, POTASSIUM CHLORIDE, CALCIUM CHLORIDE 600; 310; 30; 20 MG/100ML; MG/100ML; MG/100ML; MG/100ML
INJECTION, SOLUTION INTRAVENOUS CONTINUOUS PRN
Status: DISCONTINUED | OUTPATIENT
Start: 2021-03-11 | End: 2021-03-11 | Stop reason: SDUPTHER

## 2021-03-11 RX ORDER — NOREPINEPHRINE BIT/0.9 % NACL 16MG/250ML
2-100 INFUSION BOTTLE (ML) INTRAVENOUS CONTINUOUS
Status: DISCONTINUED | OUTPATIENT
Start: 2021-03-11 | End: 2021-03-12

## 2021-03-11 RX ORDER — MAGNESIUM SULFATE IN WATER 40 MG/ML
2000 INJECTION, SOLUTION INTRAVENOUS ONCE
Status: DISCONTINUED | OUTPATIENT
Start: 2021-03-11 | End: 2021-03-11

## 2021-03-11 RX ORDER — POTASSIUM CHLORIDE 20 MEQ/1
40 TABLET, EXTENDED RELEASE ORAL PRN
Status: DISCONTINUED | OUTPATIENT
Start: 2021-03-11 | End: 2021-03-12

## 2021-03-11 RX ORDER — HEPARIN SODIUM 1000 [USP'U]/ML
80 INJECTION, SOLUTION INTRAVENOUS; SUBCUTANEOUS PRN
Status: DISCONTINUED | OUTPATIENT
Start: 2021-03-11 | End: 2021-03-12

## 2021-03-11 RX ORDER — GLYCOPYRROLATE 1 MG/5 ML
SYRINGE (ML) INTRAVENOUS PRN
Status: DISCONTINUED | OUTPATIENT
Start: 2021-03-11 | End: 2021-03-11 | Stop reason: SDUPTHER

## 2021-03-11 RX ADMIN — SODIUM CHLORIDE, POTASSIUM CHLORIDE, SODIUM LACTATE AND CALCIUM CHLORIDE: 600; 310; 30; 20 INJECTION, SOLUTION INTRAVENOUS at 16:36

## 2021-03-11 RX ADMIN — METOPROLOL TARTRATE 2.5 MG: 5 INJECTION, SOLUTION INTRAVENOUS at 19:26

## 2021-03-11 RX ADMIN — FAMOTIDINE 20 MG: 10 INJECTION INTRAVENOUS at 20:51

## 2021-03-11 RX ADMIN — Medication 2 MCG/MIN: at 22:45

## 2021-03-11 RX ADMIN — Medication 0.4 MG: at 19:20

## 2021-03-11 RX ADMIN — SODIUM CHLORIDE, PRESERVATIVE FREE 10 ML: 5 INJECTION INTRAVENOUS at 09:42

## 2021-03-11 RX ADMIN — FENTANYL CITRATE 25 MCG: 50 INJECTION, SOLUTION INTRAMUSCULAR; INTRAVENOUS at 19:07

## 2021-03-11 RX ADMIN — OXYCODONE HYDROCHLORIDE AND ACETAMINOPHEN 500 MG: 500 TABLET ORAL at 09:41

## 2021-03-11 RX ADMIN — PHENYLEPHRINE HYDROCHLORIDE 100 MCG: 10 INJECTION INTRAVENOUS at 17:19

## 2021-03-11 RX ADMIN — HEPARIN SODIUM 5000 UNITS: 1000 INJECTION INTRAVENOUS; SUBCUTANEOUS at 18:25

## 2021-03-11 RX ADMIN — ACETAMINOPHEN 650 MG: 325 TABLET ORAL at 02:26

## 2021-03-11 RX ADMIN — METOPROLOL TARTRATE 1.5 MG: 5 INJECTION, SOLUTION INTRAVENOUS at 19:13

## 2021-03-11 RX ADMIN — SODIUM CHLORIDE: 900 INJECTION, SOLUTION INTRAVENOUS at 17:00

## 2021-03-11 RX ADMIN — Medication 3 MG: at 19:20

## 2021-03-11 RX ADMIN — PHENYLEPHRINE HYDROCHLORIDE 100 MCG: 10 INJECTION INTRAVENOUS at 18:16

## 2021-03-11 RX ADMIN — PHENYLEPHRINE HYDROCHLORIDE 100 MCG: 10 INJECTION INTRAVENOUS at 18:37

## 2021-03-11 RX ADMIN — PHENYLEPHRINE HYDROCHLORIDE 50 MCG: 10 INJECTION INTRAVENOUS at 17:59

## 2021-03-11 RX ADMIN — DILTIAZEM HYDROCHLORIDE 5 MG/HR: 5 INJECTION INTRAVENOUS at 22:18

## 2021-03-11 RX ADMIN — PHENYLEPHRINE HYDROCHLORIDE 50 MCG: 10 INJECTION INTRAVENOUS at 18:36

## 2021-03-11 RX ADMIN — CEFAZOLIN 2000 MG: 1 INJECTION, POWDER, FOR SOLUTION INTRAMUSCULAR; INTRAVENOUS at 17:08

## 2021-03-11 RX ADMIN — LIDOCAINE HYDROCHLORIDE 50 MG: 10 INJECTION, SOLUTION EPIDURAL; INFILTRATION; INTRACAUDAL; PERINEURAL at 16:49

## 2021-03-11 RX ADMIN — HEPARIN SODIUM AND DEXTROSE 18 UNITS/KG/HR: 10000; 5 INJECTION INTRAVENOUS at 16:36

## 2021-03-11 RX ADMIN — PHENYLEPHRINE HYDROCHLORIDE 50 MCG: 10 INJECTION INTRAVENOUS at 18:13

## 2021-03-11 RX ADMIN — CEFTRIAXONE SODIUM 1000 MG: 1 INJECTION, POWDER, FOR SOLUTION INTRAMUSCULAR; INTRAVENOUS at 22:18

## 2021-03-11 RX ADMIN — SODIUM CHLORIDE, PRESERVATIVE FREE 10 ML: 5 INJECTION INTRAVENOUS at 20:44

## 2021-03-11 RX ADMIN — PHENYLEPHRINE HYDROCHLORIDE 100 MCG/MIN: 10 INJECTION INTRAVENOUS at 17:22

## 2021-03-11 RX ADMIN — METOPROLOL TARTRATE 2.5 MG: 5 INJECTION, SOLUTION INTRAVENOUS at 16:57

## 2021-03-11 RX ADMIN — FENTANYL CITRATE 25 MCG: 50 INJECTION, SOLUTION INTRAMUSCULAR; INTRAVENOUS at 19:18

## 2021-03-11 RX ADMIN — HEPARIN SODIUM 18 UNITS/KG/HR: 10000 INJECTION, SOLUTION INTRAVENOUS at 20:44

## 2021-03-11 RX ADMIN — ROCURONIUM BROMIDE 40 MG: 10 INJECTION INTRAVENOUS at 16:49

## 2021-03-11 RX ADMIN — HEPARIN SODIUM 18 UNITS/KG/HR: 10000 INJECTION, SOLUTION INTRAVENOUS at 13:53

## 2021-03-11 RX ADMIN — METOPROLOL TARTRATE 1 MG: 5 INJECTION, SOLUTION INTRAVENOUS at 19:21

## 2021-03-11 RX ADMIN — ROCURONIUM BROMIDE 10 MG: 10 INJECTION INTRAVENOUS at 17:17

## 2021-03-11 RX ADMIN — ETOMIDATE 17 MG: 2 INJECTION, SOLUTION INTRAVENOUS at 16:49

## 2021-03-11 RX ADMIN — PHENYLEPHRINE HYDROCHLORIDE 100 MCG: 10 INJECTION INTRAVENOUS at 17:26

## 2021-03-11 RX ADMIN — FENTANYL CITRATE 25 MCG: 50 INJECTION, SOLUTION INTRAMUSCULAR; INTRAVENOUS at 19:27

## 2021-03-11 RX ADMIN — PHENYLEPHRINE HYDROCHLORIDE 50 MCG: 10 INJECTION INTRAVENOUS at 18:15

## 2021-03-11 RX ADMIN — PHENYLEPHRINE HYDROCHLORIDE 100 MCG: 10 INJECTION INTRAVENOUS at 17:25

## 2021-03-11 RX ADMIN — ONDANSETRON 4 MG: 2 INJECTION INTRAMUSCULAR; INTRAVENOUS at 19:21

## 2021-03-11 RX ADMIN — FENTANYL CITRATE 25 MCG: 50 INJECTION, SOLUTION INTRAMUSCULAR; INTRAVENOUS at 19:00

## 2021-03-11 ASSESSMENT — PULMONARY FUNCTION TESTS
PIF_VALUE: 16
PIF_VALUE: 16
PIF_VALUE: 17
PIF_VALUE: 15
PIF_VALUE: 16
PIF_VALUE: 16
PIF_VALUE: 2
PIF_VALUE: 1
PIF_VALUE: 17
PIF_VALUE: 16
PIF_VALUE: 17
PIF_VALUE: 16
PIF_VALUE: 15
PIF_VALUE: 17
PIF_VALUE: 2
PIF_VALUE: 16
PIF_VALUE: 16
PIF_VALUE: 17
PIF_VALUE: 16
PIF_VALUE: 25
PIF_VALUE: 16
PIF_VALUE: 17
PIF_VALUE: 16
PIF_VALUE: 17
PIF_VALUE: 16
PIF_VALUE: 17
PIF_VALUE: 16
PIF_VALUE: 15
PIF_VALUE: 16
PIF_VALUE: 1
PIF_VALUE: 16
PIF_VALUE: 16
PIF_VALUE: 17
PIF_VALUE: 16
PIF_VALUE: 4
PIF_VALUE: 17
PIF_VALUE: 16
PIF_VALUE: 16
PIF_VALUE: 17
PIF_VALUE: 16
PIF_VALUE: 15
PIF_VALUE: 17
PIF_VALUE: 2
PIF_VALUE: 17
PIF_VALUE: 16
PIF_VALUE: 16
PIF_VALUE: 1
PIF_VALUE: 16
PIF_VALUE: 17
PIF_VALUE: 0
PIF_VALUE: 15
PIF_VALUE: 16
PIF_VALUE: 1
PIF_VALUE: 16
PIF_VALUE: 17
PIF_VALUE: 17
PIF_VALUE: 16
PIF_VALUE: 16
PIF_VALUE: 17
PIF_VALUE: 15
PIF_VALUE: 1
PIF_VALUE: 16
PIF_VALUE: 16
PIF_VALUE: 17
PIF_VALUE: 16
PIF_VALUE: 17
PIF_VALUE: 17
PIF_VALUE: 16
PIF_VALUE: 16
PIF_VALUE: 1
PIF_VALUE: 17
PIF_VALUE: 17
PIF_VALUE: 16
PIF_VALUE: 17
PIF_VALUE: 1
PIF_VALUE: 16
PIF_VALUE: 17
PIF_VALUE: 18
PIF_VALUE: 16
PIF_VALUE: 15
PIF_VALUE: 16
PIF_VALUE: 19
PIF_VALUE: 17
PIF_VALUE: 15
PIF_VALUE: 16
PIF_VALUE: 17

## 2021-03-11 ASSESSMENT — ENCOUNTER SYMPTOMS
COUGH: 0
CHEST TIGHTNESS: 0
SORE THROAT: 0
NAUSEA: 0
COLOR CHANGE: 0
SHORTNESS OF BREATH: 0
ABDOMINAL DISTENTION: 0
ABDOMINAL PAIN: 0
EYE DISCHARGE: 0

## 2021-03-11 ASSESSMENT — PAIN SCALES - GENERAL
PAINLEVEL_OUTOF10: 0
PAINLEVEL_OUTOF10: 3

## 2021-03-11 NOTE — PROGRESS NOTES
issues, ECF confirmed that he does. Note pt was intervention to left leg, plan TT to [place]. 3/10/21: Sodium 135, Potassium 5.5, BUN 23, Creatinine 1.2, Glucose 133. Rx: remeron, lipitor, vitamin C. Wounds:  Stage II(stage II coccyx)       Current Nutrition Therapies:    DIET GENERAL;  Dietary Nutrition Supplements: Renal Oral Supplement    Anthropometric Measures:  · Height: 6' 2\" (188 cm)  · Current Body Weight: 142 lb 1.6 oz (64.5 kg)(3/11/21 no edema)   · Admission Body Weight: 142 lb 1.6 oz (64.5 kg)(3/11/21 no edema)    · Usual Body Weight: (Per ECF: 1/20/21: 159#, 2/26/21: 145.2#, 3/8/21: 143.5#)     · Ideal Body Weight: 190 lbs;   · BMI: 18.2  · Adjusted Body Weight:  ; No Adjustment   · BMI Categories: Underweight (BMI less than 22) age over 72       Nutrition Diagnosis:   · Severe malnutrition, In context of chronic illness related to inadequate protein-energy intake as evidenced by poor intake prior to admission, weight loss, severe loss of subcutaneous fat, severe muscle loss      Nutrition Interventions:   Food and/or Nutrient Delivery:  Continue Current Diet, Start Oral Nutrition Supplement  Nutrition Education/Counseling:  Education initiated(Encouraged oral intake and ONS use.)   Coordination of Nutrition Care:  Continue to monitor while inpatient    Goals:  Patient will consume 75% or more of meals during LOS. Nutrition Monitoring and Evaluation:   Behavioral-Environmental Outcomes:  None Identified   Food/Nutrient Intake Outcomes:  Food and Nutrient Intake, Supplement Intake, Vitamin/Mineral Intake  Physical Signs/Symptoms Outcomes:  Biochemical Data, GI Status, Nutrition Focused Physical Findings, Skin, Weight     Discharge Planning:     Too soon to determine     Electronically signed by Armida Boone RD, LD on 3/11/21 at 12:22 PM EST    Contact: [phone]

## 2021-03-11 NOTE — DISCHARGE INSTR - COC
Continuity of Care Form    Patient Name: Bonnie Issa   :  1934  MRN:  [id number]    [facility] date:  3/10/2021  Discharge date:  ***    Code Status Order: [facility]   Advance Directives:   [facility] Documentation     Date/Time Healthcare Directive Type of Healthcare Directive Copy in [address] Agent's Name Healthcare Agent's Phone Number    03/10/21 6285  Yes, patient has an advance directive for healthcare treatment  Durable power of  for health care;Living will  No, copy requested from family  Healthcare power of   Leeann Toussaint  --          Admitting Physician:  Nilda Cantu DO  PCP: No primary care provider on file. Discharging Nurse: [facility] Unit/Room#: 6K-03/003-A  Discharging Unit Phone Number: ***    Emergency Contact:   Extended Emergency Contact Information  Primary Emergency Contact: none, none  Mobile Phone: [phone]  Relation: Other   needed? No  Secondary Emergency Contact: [address] [facility], [address] Phone: [phone]  Relation: Spouse    Past Surgical History:  Past Surgical History:   Procedure Laterality Date    PROSTATE SURGERY      SKIN CANCER EXCISION         Immunization History: There is no immunization history on file for this patient.     Active Problems:  Patient Active Problem List   Diagnosis Code    P[facility]peripheral vascular disease) [facility] I73.9       Isolation/Infection:   Isolation          No Isolation        Patient Infection Status     None to display          Nurse Assessment:  Last Vital Signs: BP (!) 94/49   Pulse 73   Temp 96.4 °F (35.8 °C) (Axillary)   Resp 16   Ht 6' 2\" (1.88 m)   Wt 142 lb 1.6 oz (64.5 kg)   SpO2 92%   BMI 18.24 kg/m²     Last documented pain score (0-10 scale): Pain Level: 3  Last Weight:   Wt Readings from Last 1 Encounters:   21 142 lb 1.6 oz (64.5 kg)     Mental Status:  {IP PT MENTAL STATUS:}    IV Access:  [address] [facility] [address] IV EYZZXM:779466843}    Nursing Mobility/ADLs:  Walking   {CHP DME UMKT:205951891}  Transfer  {CHP DME JSLX:617255786}  Bathing  {CHP DME JTGQ:765434898}  Dressing  {CHP DME FXCP:117244593}  Toileting  {CHP DME ISCA:287874016}  Feeding  {CHP DME XWSM:520521611}  Med Admin  {CHP DME XMGX:362591273}  Med Delivery   {[facility] MED Delivery:454366134}    Wound Care Documentation and Therapy:  Wound 03/10/21 Coccyx nonblanchable redness (Active)   Wound Etiology Pressure Stage  2 21 0853   Dressing/Treatment Protective barrier;Zinc paste; Foam 21 0853   Wound Assessment Non-blanchable erythema;Pink/red 21 0853   Xiao-wound Assessment Blanchable erythema 21 0853   Number of days: 0        Elimination:  Continence:   · Bowel: {YES / EX:54431}  · Bladder: {YES / ZI:49672}  Urinary Catheter: {Urinary Catheter:798483648}   Colostomy/Ileostomy/Ileal Conduit: {YES / Z}       Date of Last BM: ***    Intake/Output Summary (Last 24 hours) at 3/11/2021 0958  Last data filed at 3/11/2021 0332  Gross per 24 hour   Intake 10 ml   Output 0 ml   Net 10 ml     I/O last 3 completed shifts:   In: 10 [I.V.:10]  Out: 0     Safety Concerns:     [address] [employer] Safety Concerns:356625181}    Impairments/Disabilities:      [address] [employer] Impairments/Disabilities:770976403}    Nutrition Therapy:  Current Nutrition Therapy:   [address] [employer] Diet List:860424744}    Routes of Feeding: {CHP DME Other Feedings:250193390}  Liquids: {Slp liquid thickness:57393}  Daily Fluid Restriction: {CHP DME Yes amt example:242849696}  Last Modified Barium Swallow with Video (Video Swallowing Test): {Done Not Done FXHZ:876715728}    Treatments at the Time of Hospital Discharge:   Respiratory Treatments: ***  Oxygen Therapy:  {Therapy; copd oxygen:19529}  Ventilator:    { CC Vent UDGI:658919636}    Rehab Therapies: {THERAPEUTIC INTERVENTION:5656073736}  Weight Bearing Status/Restrictions: [address] Weight Bearin}  Other Medical Equipment (for information only, NOT a DME order):  {EQUIPMENT:498952043}  Other Treatments: ***    Patient's personal belongings (please select all that are sent with patient):  {CHP DME Belongings:509989690}    RN SIGNATURE:  {Esignature:829894681}    CASE MANAGEMENT/SOCIAL WORK SECTION    Inpatient Status Date: 3/11/21    Readmission Risk Assessment Score:  Readmission Risk              Risk of Unplanned Readmission:        13           Discharging to Facility/ Agency   · Name: [facility]  Address:    [address], [address] [place] [address] [place] [address]         Phone: [phone]       Fax: [phone]        ·   ·     Dialysis Facility (if applicable)   · Name:  · Address:  · Dialysis Schedule:  · Phone:  · Fax:    / signature: Electronically signed by JOSE LUIS Merida LSW on 3/11/21 at 9:58 AM EST    PHYSICIAN SECTION    Prognosis: {Prognosis:2739654526}    Condition at Discharge: [address] [place] [address] Patient Condition:941906863}    Rehab Potential (if transferring to Rehab): {Prognosis:5961834805}    Recommended Labs or Other Treatments After Discharge: ***    Physician Certification: I certify the above information and transfer of Hector Hernandez  is necessary for the continuing treatment of the diagnosis listed and that he requires {Admit to Appropriate Level of Care:12567} for {GREATER/LESS:279146274} 30 days.      Update Admission H&P: {CHP DME Changes in DXTAE:241721501}    PHYSICIAN SIGNATURE:  {Esignature:876911587}

## 2021-03-11 NOTE — PROGRESS NOTES
Physician Progress Note      PATIENT:               Christine Daniel  CSN #:                  [id number]  :                       1934  ADMIT DATE:       3/10/2021 12:01 PM  [address] [place] DATE:  RESPONDING  PROVIDER #:        Brian Dubois MD          QUERY TEXT:    Dr Josefina Bullard,    Pt admitted with ischemic left foot. Pt noted to have Leukocytosis, abnormal   urinalysis. If possible, please document in the progress notes and discharge   summary if you are evaluating and/or treating any of the following: The medical record reflects the following:  Risk Factors: elderly, AFIB, hx COVID  Clinical Indicators: WBC 28.5, Urinalysis: Moderate leukocytes, WBC >200,   moderate blood, moderate bacteria. Treatment: Tylenol, lab monitoring  Options provided:  -- Urinary Tract Infection (UTI)  -- Bacteriuria  -- Other - I will add my own diagnosis  -- Disagree - Not applicable / Not valid  -- Disagree - Clinically unable to determine / Unknown  -- Refer to Clinical Documentation Reviewer    PROVIDER RESPONSE TEXT:    This patient has bacteriuria. Query created by: Michelle Tolentino on 3/11/2021 7:01 AM      QUERY TEXT:    Dr Josefina Bullard,    [facility] admitted with ischemic left foot. Pt noted to have Leukocytosis, abnormal   urinalysis, tachycardia, hypotensive. If possible, please document in the   progress notes and discharge summary if you are evaluating and /or treating   any of the following: The medical record reflects the following:  Risk Factors: AFIB, PVD  Clinical Indicators: WBD 28.5, BP 85/51, pulse 165, urinalysis: moderate   leukocytes, moderate bacteria & blood.   Treatment: 0.9  ml bolus, lab monitoring, Tylenol  Options provided:  -- Sepsis, POA  -- No Sepsis, UTI only  -- SIRS  -- Other - I will add my own diagnosis  -- Disagree - Not applicable / Not valid  -- Disagree - Clinically unable to determine / Unknown  -- Refer to Clinical Documentation Reviewer    PROVIDER RESPONSE TEXT:    This patient has UTI only, patient is not septic.     Query created by: Frankie Aviles on 3/11/2021 7:05 AM      Electronically signed by:  Jose Cruz Estevez MD 3/11/2021   9:13 AM

## 2021-03-11 NOTE — H&P
Division of Vascular Surgery        H&P      Name: Sukhwinder Burgos     MRN: [id number]       HPI:     80year old male with atrial fibrillation on coumadin s/p recent AAA repair with biiliac stenting who presented to [facility] yesterday with complaints of worsening numbness in left leg. Patient states that his legs are always cold but over the last day, he had worsening feelings of pins and needles, cold and numbness to the left leg. Denies pain at present. Does have motor intact. Denies any other complaints at this time, no CP, SOB, abdominal pain, N/V. Imaging performed at [facility] shows occluded left iliac limb with poor runoff to remainder of LLE. Patient was previously comfort care and started on heparin gtt. Patient and wife decided on code status change and desire intervention to attempt limb salvage. Patient was transferred to [facility] for intervention. Past Medical History:   Diagnosis Date    Atrial fibrillation ([facility]     Cancer ([facility]     skin (neck) removed    Pneumonia      Past Surgical History:   Procedure Laterality Date    PROSTATE SURGERY      SKIN CANCER EXCISION       No current facility-administered medications on file prior to encounter.       Current Outpatient Medications on File Prior to Encounter   Medication Sig Dispense Refill    Magnesium Oxide (MAG- PO) Take 400 mg by mouth daily       metoprolol succinate (TOPROL XL) 50 MG extended release tablet Take 50 mg by mouth daily      potassium & sodium phosphates (PHOS-NAK) 280-160-250 MG PACK Take 1 packet by mouth 3 times daily      ascorbic acid (VITAMIN C) 500 MG tablet Take 500 mg by mouth daily      atorvastatin (LIPITOR) 20 MG tablet Take 20 mg by mouth daily      mirtazapine (REMERON) 7.5 MG tablet Take 7.5 mg by mouth nightly      warfarin (COUMADIN) 4 MG tablet Take 1 tablet by mouth daily       Wound Dressings (HYDROGEL) GEL Apply 1 applicator topically daily as needed (for heel wound)       Review of obtained.  3D reconstructions were performed on the scanner to include oblique coronal MIP images through the abdomen,    pelvis, lower extremities, and the lung bases. .       All CT scans at this facility use dose modulation, iterative reconstruction, and/or weight-based dosing when appropriate to reduce radiation dose to as low as reasonably achievable.       FINDINGS:       Limited evaluation of the lung bases demonstrates mild dependent right basilar airspace disease which may represent atelectasis or pneumonia. There are also mild patchy groundglass opacities demonstrated at the visualized inferior lateral left upper lobe    which may also represent an inflammatory or infectious process.        The liver, gallbladder, pancreas and adrenal glands appear normal. There is heterogeneous enhancement of the spleen, likely related to phase of contrast enhancement.       There is nonspecific fluid demonstrated along the inferior pole of the right kidney. This may in part be related to motion. However, cannot exclude an exophytic cystic lesion. This is not well characterized on the current examination. No hydronephrosis    or hydroureter is seen.       There is no evidence of bowel obstruction. The appendix appears normal. There is no free air. There is a moderate amount retained stool within the rectum.       No acute osseous findings are seen.       Calcified plaques are demonstrated at the right lung base which is nonspecific. No acute osseous findings are seen. However there is multilevel lumbar degenerative disc disease and lower lumbar facet arthrosis.       Patient is status post prior aortobiiliac endograft stenting. 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 The left external iliac artery is also occluded. However there is reconstitution of flow at the left common femoral artery due to pelvic collaterals.       There is a partially calcified mass within the rightward inferior urinary bladder on axial image 155. This is concerning for a bladder neoplasm. Recommend clinical correlation.       The left superficial femoral artery demonstrates multifocal areas of high-grade stenosis which are significantly flow-limiting. In addition, the popliteal artery is very diminutive in caliber with multifocal areas of high-grade stenosis. There is    opacification of the tibial peroneal trunk. However there appears to be occlusion of the tibial peroneal trunk proximally and the anterior tibial artery appears occluded proximally as well.       The right iliac limb appears patent. The right external iliac artery and common femoral artery appear patent.       The right superficial femoral artery appears very diminutive in caliber demonstrating multifocal areas of high-grade stenosis. The distal right SFA appears occluded. The proximal right popliteal artery also appears occluded. However there is    reconstitution of flow by collateral vessels at its above-the-knee segment. The remaining popliteal artery is very diminutive in caliber. There is three-vessel runoff initially on the right. However there is occlusion of the right anterior tibial artery    distally. The dorsalis pedis artery is very small in caliber as it crosses the right ankle distally.       No lymphadenopathy is seen. There is no free air. There is no free fluid.       There are small fluid collections demonstrated adjacent to the common femoral arteries bilaterally. These may represent postoperative seromas.           Impression   1. Patient is status post prior aortobiiliac endograft stenting. 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 The left external iliac artery is also occluded. However there is reconstitution of flow at the left common femoral artery due to pelvic    collaterals.       2. There is a partially calcified mass within the rightward inferior urinary bladder on axial image 155. This is concerning for a bladder neoplasm.  Recommend clinical correlation.     3. The left superficial femoral artery demonstrates multifocal areas of high-grade stenosis which are significantly flow-limiting. In addition, the left popliteal artery is very diminutive in caliber with multifocal areas of high-grade stenosis. There is    opacification of the tibial peroneal trunk. However there appears to be occlusion proximally and the anterior tibial artery appears occluded proximally. No runoff vessels are seen crossing the left ankle distally.       4. The right superficial femoral artery appears very diminutive in caliber demonstrating multifocal areas of high-grade stenosis. The distal right SFA appears occluded.        5. The proximal right popliteal artery also appears occluded. However there is reconstitution of flow by collateral vessels at its above-the-knee segment. The remaining popliteal artery is very diminutive in caliber.        6. There is three-vessel runoff initially on the right. However there is occlusion of the right anterior tibial artery distally. The dorsalis pedis artery is very small in caliber as it crosses the right ankle distally.       7. Limited evaluation of the lung bases demonstrates mild dependent right basilar airspace disease which may represent atelectasis or pneumonia. There are also mild patchy groundglass opacities demonstrated at the visualized inferior lateral left upper    lobe which may also represent an inflammatory or infectious process.       8. There are small fluid collections demonstrated adjacent to the common femoral arteries bilaterally. These may represent postoperative seromas.           **This report has been created using voice recognition software.  It may contain minor errors which are inherent in voice recognition technology. **       Final report electronically signed by Dr. Castro Galindo on 3/10/2021 3:26 PM       Assessment/Plan:      To OR for emergent LLE revascularization  Post op orders to follow    Electronically signed by Cassidy Palma Leonela Cooper MD on 3/11/21 at 3:56 PM EST      [address] [place] [address],[address]: [phone]  C: [phone]  Email: [email or web address]

## 2021-03-11 NOTE — CARE COORDINATION
La3/11/21, 10:04 AM EST  Discharge Planning Evaluation  Social work consult received, patient from [facility]. Patient/Family preference is to return to [facility]. The patient's current payor source at the facility is medicare. Medicare skilled days available: yes  Insurance precert:   no  Spoke with Gregg Pen at the facility. Patient bed hold: unofficial at this time  Anticipated transport plan: undetermined  Do they require COVID 19 test to return to [facility]: yes  Is there a required time frame which which COVID test needs done: required within 24 hours prior to admission. SW notified that Patient is transferring to [place] [address] by life flight today.

## 2021-03-11 NOTE — CARE COORDINATION
3/11/21, 7:10 AM EST  DISCHARGE PLANNING EVALUATION:    Enma Hubbard       Admitted: 3/10/2021/ Fanny Dove day: 1   Location: [facility]03/003-A Reason for admit: PVD (peripheral vascular disease) ([facility] [address] [I73.9]   PMH:  has a past medical history of Atrial fibrillation ([facility] [address] Cancer ([facility] [address] and Pneumonia. Barriers to Discharge:  WBC 25.1, INR 2.23, BNP 29506, K+5.5. Heparin gtt initiated and now held. [facility] consult pending. Palliative care eval.   PCP: No primary care provider on file. Readmission Risk Score: 13%    Patient Goals/Plan/Treatment Preferences: From [facility]. SW consulted. Transportation/Food Security/Housekeeping Addressed:  No issues identified.

## 2021-03-11 NOTE — FLOWSHEET NOTE
This note also relates to the following rows which could not be included:  Pulse - Cannot attach notes to unvalidated device data

## 2021-03-11 NOTE — PROGRESS NOTES
Pt received to room #8 from life flight for cold Left leg and diminished pulses from [facility]. Dr Myesha Alfaro and anesthesia at bedside for medical evaluation and plan of care.

## 2021-03-11 NOTE — PROGRESS NOTES
Called wife Re:covid history. Pt wife's states that he tested positive Feb 11th.  He was discharged from hospital Feb 26th-March 10 to rehab [facility] home

## 2021-03-11 NOTE — DISCHARGE SUMMARY
Hospitalist discharge note      Patient:  Parul Lund  YOB: 1934    MRN: [id number]     [place]: [de-identified]    PCP: No primary care provider on file. Date of Admission: 3/10/2021    Date of Service: Pt seen/examined on 03/11/21  and Admitted to Inpatient with expected LOS greater than two midnights due to medical therapy. ASSESSMENT/PLAN:    1. Critical limb ischemic left foot--history of PVD status post aortofemoral bypass -prior to admission- [facility] from the ER and initially wanted the patient transferred to [facility] however  patient is a comfort care and [facility] was okay with a heparin drip and seeing him here and proceeding accordingly;admittiing NP, with pharmacy and his INR is at 2.51 so we will hold on the heparin and recheck INR    3/11-discussed the case with the interventional cardiologists, and cardiovascular surgery team,  CV surgery team would like the patient to be transferred to [facility] for further care given the complexity of the issue. Patient and family now wants to be full code-INR is therapeutic however as per the advice from the vascular surgeons from [facility] heparin drip should be continued without bolus will initiate this. 2. Atrial Fib with RVR--rate controlled-off Cardizem drip on lopressor, chronic Coumadin therapy with therapeutic INR; Coumadin on hold currently however INR is still therapeutic-discussed the case with the vascular surgeons in [facility] advised to continue heparin drip without any bolus. 3.  UTI and leukocytosis-no sepsis-start ceftriaxone-check urine cultures    4. New bladder mass that is found on the CT of the abdomen scanning likely incidental-we will need urology to follow-up for biopsy-patient is leaving to [facility] soon for the leg may see urologist at that center.     4. CODE STATUS--family and patient now  wanting to be full code    Transfer to [facility] vascular surgeon initiated around 10 am-spoke to Dr Reina Rendon and Dr Rufino Olivarez around 1:10pm- vascular surgeons down in [facility], accepted patient- will be life flighted to [facility]     Stable to transfer to [facility] for further evaluation and care. Discharge time 55 minutes    Chief Complaint: Cold leg    History Of Present Illness:    80 y.o. male who presented to [facility] with a cold left leg; patient has a past medical history of atrial fibrillation and is on chronic Coumadin therapy with a therapeutic INR; patient was transferred from [facility] secondary to left lower leg numbness and coldness, pulses are unable to be palpated on that left lower extremity; he denies any complaints, he denies any lightheadedness or dizziness, cough, chest pain, shortness of breath, nausea, his only complaint is numbness to that left lower leg; heparin drip was initiated in the emergency department, cardiovascular surgeon was contacted and initially when the patient transferred to [place] however on further communication via cardiothoracic surgeon and the ED physician it was decided to keep the patient here, initiate a heparin drip and proceed accordingly; he is being admitted to the hospital service for further care and evaluation. Subjective:- (Last 24 hours)  No chest pain no nausea no vomiting  Wanting to be full code      Past medical history, family history, social history and allergies reviewed again and is unchanged since admission. ROS (12 point review of systems completed. Pertinent positives noted.  Otherwise ROS is negative)      Scheduled Meds:   cefTRIAXone (ROCEPHIN) IV  1,000 mg Intravenous Q24H    sodium chloride flush  10 mL Intravenous 2 times per day    [Held by provider] heparin (porcine)  80 Units/kg Intravenous Once    ascorbic acid  500 mg Oral Daily    atorvastatin  20 mg Oral Daily    metoprolol succinate  50 mg Oral Daily    mirtazapine  7.5 mg Oral Nightly     Continuous Infusions:   heparin (PORCINE) Infusion       PRN Meds:.sodium chloride flush, promethazine **OR** ondansetron, polyethylene glycol, acetaminophen **OR** acetaminophen, [Held by provider] heparin (porcine), [Held by provider] heparin (porcine)     PHYSICAL EXAM:    BP (!) 99/49   Pulse 104   Temp 97.5 °F (36.4 °C) (Oral)   Resp 17   Ht 6' 2\" (1.88 m)   Wt 142 lb 1.6 oz (64.5 kg)   SpO2 92%   BMI 18.24 kg/m²     General appearance:  No apparent distress, appears stated age and cooperative. HEENT:  Normal cephalic, atraumatic without obvious deformity. Pupils equal, round, and reactive to light. Conjunctivae/corneas clear. Neck: Supple, with full range of motion. No jugular venous distention. Trachea midline. Respiratory:  Normal respiratory effort. Clear to auscultation, bilaterally without Rales/Wheezes/Rhonchi. Cardiovascular:  irregular rate and rhythm   Abdomen: Soft, non-tender, non-distended with normal bowel sounds. Musculoskeletal: Left lower leg and foot cold, unable to palpate or auscultate pulses  Skin: Skin color, texture, turgor normal.    Neurologic:  Neurovascularly intact without any focal sensory/motor deficits. Cranial nerves: II-XII intact, grossly non-focal.  Psychiatric:  Alert and oriented x 3, thought content appropriate  Capillary Refill: Brisk,< 3 seconds   Peripheral Pulses: Right pedal and posterior tibial are about a 1+, left pedal and posterior tibial are absent      Labs:     Recent Labs     03/10/21  1313 03/10/21  1744   WBC 28.5* 25.1*   HGB 11.7* 11.9*   HCT 37.9* 39.3*    191     Recent Labs     03/10/21  1313      K 5.5*      CO2 19*   BUN 23*   CREATININE 1.2   CALCIUM 8.7     Recent Labs     03/10/21  1313   AST 18   ALT 8*   BILITOT 1.0   ALKPHOS 67     Recent Labs     03/10/21  1313 03/11/21  0431   INR 2.51* 2.23*     Recent Labs     03/10/21  1313   TROPONINT < 0.010     Radiology:     Ct Head Wo Contrast    Result Date: 3/10/2021  PROCEDURE: CT HEAD WO CONTRAST CLINICAL INFORMATION: LLE WEAKNESS .  COMPARISON: No prior study. TECHNIQUE: 2-D multiplanar noncontrast images of the brain All CT scans at this facility use dose modulation, iterative reconstruction, and/or weight-based dosing when appropriate to reduce radiation dose to as low as reasonably achievable. FINDINGS: Generalized cerebral volume loss. Remote left cerebellar infarct. Mild periventricular diminished white matter attenuation. Ventricles are normal. No hemorrhage or extra-axial collection. No acute infarction is seen. Calvarium is intact. This was paranasal sinuses and mastoid air cells are clear. No acute process. Chronic changes detailed above **This report has been created using voice recognition software. It may contain minor errors which are inherent in voice recognition technology. ** Final report electronically signed by Dr. Riki Blevins on 3/10/2021 1:07 PM    Cta Abdominal Aorta W Bilat Runoff W Wo Contrast    Result Date: 3/10/2021  PROCEDURE: CTA ABDOMINAL AORTA W BILAT RUNOFF W WO CONTRAST CLINICAL INFORMATION: LLE cold COMPARISON: None TECHNIQUE: 1.5 mm axial images were obtained through the chest after the administration of IV contrast.  A non-contrast localizer was obtained. 3D reconstructions were performed on the scanner to include oblique coronal MIP images through the abdomen, pelvis, lower extremities, and the lung bases. . All CT scans at this facility use dose modulation, iterative reconstruction, and/or weight-based dosing when appropriate to reduce radiation dose to as low as reasonably achievable. FINDINGS: Limited evaluation of the lung bases demonstrates mild dependent right basilar airspace disease which may represent atelectasis or pneumonia. There are also mild patchy groundglass opacities demonstrated at the visualized inferior lateral left upper lobe  which may also represent an inflammatory or infectious process.   The liver, gallbladder, pancreas and adrenal glands appear normal. There is heterogeneous enhancement of the spleen, likely related to phase of contrast enhancement. There is nonspecific fluid demonstrated along the inferior pole of the right kidney. This may in part be related to motion. However, cannot exclude an exophytic cystic lesion. This is not well characterized on the current examination. No hydronephrosis or hydroureter is seen. There is no evidence of bowel obstruction. The appendix appears normal. There is no free air. There is a moderate amount retained stool within the rectum. No acute osseous findings are seen. Calcified plaques are demonstrated at the right lung base which is nonspecific. No acute osseous findings are seen. However there is multilevel lumbar degenerative disc disease and lower lumbar facet arthrosis. Patient is status post prior aortobiiliac endograft stenting. 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 The left external iliac artery is also occluded. However there is reconstitution of flow at the left common femoral artery due to pelvic collaterals. There is a partially calcified mass within the rightward inferior urinary bladder on axial image 155. This is concerning for a bladder neoplasm. Recommend clinical correlation. The left superficial femoral artery demonstrates multifocal areas of high-grade stenosis which are significantly flow-limiting. In addition, the popliteal artery is very diminutive in caliber with multifocal areas of high-grade stenosis. There is opacification of the tibial peroneal trunk. However there appears to be occlusion of the tibial peroneal trunk proximally and the anterior tibial artery appears occluded proximally as well. The right iliac limb appears patent. The right external iliac artery and common femoral artery appear patent. The right superficial femoral artery appears very diminutive in caliber demonstrating multifocal areas of high-grade stenosis. The distal right SFA appears occluded. The proximal right popliteal artery also appears occluded.  However there is reconstitution of flow by collateral vessels at its above-the-knee segment. The remaining popliteal artery is very diminutive in caliber. There is three-vessel runoff initially on the right. However there is occlusion of the right anterior tibial artery distally. The dorsalis pedis artery is very small in caliber as it crosses the right ankle distally. No lymphadenopathy is seen. There is no free air. There is no free fluid. There are small fluid collections demonstrated adjacent to the common femoral arteries bilaterally. These may represent postoperative seromas. 1.Patient is status post prior aortobiiliac endograft stenting. 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 The left external iliac artery is also occluded. However there is reconstitution of flow at the left common femoral artery due to pelvic collaterals. 2. There is a partially calcified mass within the rightward inferior urinary bladder on axial image 155. This is concerning for a bladder neoplasm. Recommend clinical correlation. 3. The left superficial femoral artery demonstrates multifocal areas of high-grade stenosis which are significantly flow-limiting. In addition, the left popliteal artery is very diminutive in caliber with multifocal areas of high-grade stenosis. There is  opacification of the tibial peroneal trunk. However there appears to be occlusion proximally and the anterior tibial artery appears occluded proximally. No runoff vessels are seen crossing the left ankle distally. 4. The right superficial femoral artery appears very diminutive in caliber demonstrating multifocal areas of high-grade stenosis. The distal right SFA appears occluded. 5. The proximal right popliteal artery also appears occluded. However there is reconstitution of flow by collateral vessels at its above-the-knee segment. The remaining popliteal artery is very diminutive in caliber. 6. There is three-vessel runoff initially on the right.  However there is occlusion of the right anterior tibial artery distally. The dorsalis pedis artery is very small in caliber as it crosses the right ankle distally. 7. Limited evaluation of the lung bases demonstrates mild dependent right basilar airspace disease which may represent atelectasis or pneumonia. There are also mild patchy groundglass opacities demonstrated at the visualized inferior lateral left upper lobe which may also represent an inflammatory or infectious process. 8. There are small fluid collections demonstrated adjacent to the common femoral arteries bilaterally. These may represent postoperative seromas. **This report has been created using voice recognition software. It may contain minor errors which are inherent in voice recognition technology. ** Final report electronically signed by Dr. Mimi Conley on 3/10/2021 3:26 PM    Xr Chest Portable    Result Date: 3/10/2021  PROCEDURE: XR CHEST PORTABLE CLINICAL INFORMATION: sob. COMPARISON: No prior study. TECHNIQUE: 2 AP radiographs of the chest.. FINDINGS: No pneumothorax. Blunting of the right costophrenic angle may represent atelectasis or consolidation. Increased parenchymal markings without focal area of consolidation is nonspecific. Mild cardiac prominence. No acute osseous abnormality. Mild prominent interstitial lung markings may represent infectious process or pulmonary edema. **This report has been created using voice recognition software. It may contain minor errors which are inherent in voice recognition technology. ** Final report electronically signed by Dr. Antelmo Sanz on 3/10/2021 3:16 PM    Thank you No primary care provider on file. for the opportunity to be involved in this patient's care.     Electronically signed by Jennifer Keller MD on 3/11/2021 at 1:21 PM

## 2021-03-11 NOTE — CONSULTS
CT/CV Surgery Consult Note    3/11/2021 9:34 AM  Surgeon:  Dr. Nelly Parson     Reason for Consult: PVD/ Ischemic left leg    CC:   PVD/Ischemic left leg    HPI:    Mr. Alicia Teran  is a 80year old male with a PMH including pneumonia, cancer, and atrial fibrillation. The pt presented yesterday for evaluation of LLE ischemic foot symptoms. He was transferred from [place] ED due to left lower leg numbness, coldness, and non palpable pulses. CTA abdominal aorta with runoff obtained in the ED ( see results below). Heparin gtt was initated and the pt was admitted. The pt's code status at this time is comfort care. Narrative   PROCEDURE: CTA ABDOMINAL AORTA W BILAT RUNOFF W WO CONTRAST       CLINICAL INFORMATION: LLE cold       COMPARISON: None       TECHNIQUE: 1.5 mm axial images were obtained through the chest after the administration of IV contrast.  A non-contrast localizer was obtained.  3D reconstructions were performed on the scanner to include oblique coronal MIP images through the abdomen,    pelvis, lower extremities, and the lung bases. .       All CT scans at this facility use dose modulation, iterative reconstruction, and/or weight-based dosing when appropriate to reduce radiation dose to as low as reasonably achievable.       FINDINGS:       Limited evaluation of the lung bases demonstrates mild dependent right basilar airspace disease which may represent atelectasis or pneumonia. There are also mild patchy groundglass opacities demonstrated at the visualized inferior lateral left upper lobe    which may also represent an inflammatory or infectious process.        The liver, gallbladder, pancreas and adrenal glands appear normal. There is heterogeneous enhancement of the spleen, likely related to phase of contrast enhancement.       There is nonspecific fluid demonstrated along the inferior pole of the right kidney. This may in part be related to motion. However, cannot exclude an exophytic cystic lesion.  This is not well characterized on the current examination. No hydronephrosis    or hydroureter is seen.       There is no evidence of bowel obstruction. The appendix appears normal. There is no free air. There is a moderate amount retained stool within the rectum.       No acute osseous findings are seen.       Calcified plaques are demonstrated at the right lung base which is nonspecific. No acute osseous findings are seen. However there is multilevel lumbar degenerative disc disease and lower lumbar facet arthrosis.       Patient is status post prior aortobiiliac endograft stenting. 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 The left external iliac artery is also occluded. However there is reconstitution of flow at the left common femoral artery due to pelvic collaterals.       There is a partially calcified mass within the rightward inferior urinary bladder on axial image 155. This is concerning for a bladder neoplasm. Recommend clinical correlation.       The left superficial femoral artery demonstrates multifocal areas of high-grade stenosis which are significantly flow-limiting. In addition, the popliteal artery is very diminutive in caliber with multifocal areas of high-grade stenosis. There is    opacification of the tibial peroneal trunk. However there appears to be occlusion of the tibial peroneal trunk proximally and the anterior tibial artery appears occluded proximally as well.       The right iliac limb appears patent. The right external iliac artery and common femoral artery appear patent.       The right superficial femoral artery appears very diminutive in caliber demonstrating multifocal areas of high-grade stenosis. The distal right SFA appears occluded. The proximal right popliteal artery also appears occluded. However there is    reconstitution of flow by collateral vessels at its above-the-knee segment. The remaining popliteal artery is very diminutive in caliber.  There is three-vessel runoff initially on the right. However there is occlusion of the right anterior tibial artery    distally. The dorsalis pedis artery is very small in caliber as it crosses the right ankle distally.       No lymphadenopathy is seen. There is no free air. There is no free fluid.       There are small fluid collections demonstrated adjacent to the common femoral arteries bilaterally. These may represent postoperative seromas.           Impression   1. Patient is status post prior aortobiiliac endograft stenting. 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 The left external iliac artery is also occluded. However there is reconstitution of flow at the left common femoral artery due to pelvic    collaterals.       2. There is a partially calcified mass within the rightward inferior urinary bladder on axial image 155. This is concerning for a bladder neoplasm. Recommend clinical correlation.       3. The left superficial femoral artery demonstrates multifocal areas of high-grade stenosis which are significantly flow-limiting. In addition, the left popliteal artery is very diminutive in caliber with multifocal areas of high-grade stenosis. There is    opacification of the tibial peroneal trunk. However there appears to be occlusion proximally and the anterior tibial artery appears occluded proximally. No runoff vessels are seen crossing the left ankle distally.       4. The right superficial femoral artery appears very diminutive in caliber demonstrating multifocal areas of high-grade stenosis. The distal right SFA appears occluded.        5. The proximal right popliteal artery also appears occluded. However there is reconstitution of flow by collateral vessels at its above-the-knee segment. The remaining popliteal artery is very diminutive in caliber.        6. There is three-vessel runoff initially on the right. However there is occlusion of the right anterior tibial artery distally.  The dorsalis pedis artery is very small in caliber as it performed on the scanner to include oblique coronal MIP images through the abdomen,    pelvis, lower extremities, and the lung bases. .       All CT scans at this facility use dose modulation, iterative reconstruction, and/or weight-based dosing when appropriate to reduce radiation dose to as low as reasonably achievable.       FINDINGS:       Limited evaluation of the lung bases demonstrates mild dependent right basilar airspace disease which may represent atelectasis or pneumonia. There are also mild patchy groundglass opacities demonstrated at the visualized inferior lateral left upper lobe    which may also represent an inflammatory or infectious process.        The liver, gallbladder, pancreas and adrenal glands appear normal. There is heterogeneous enhancement of the spleen, likely related to phase of contrast enhancement.       There is nonspecific fluid demonstrated along the inferior pole of the right kidney. This may in part be related to motion. However, cannot exclude an exophytic cystic lesion. This is not well characterized on the current examination. No hydronephrosis    or hydroureter is seen.       There is no evidence of bowel obstruction. The appendix appears normal. There is no free air. There is a moderate amount retained stool within the rectum.       No acute osseous findings are seen.       Calcified plaques are demonstrated at the right lung base which is nonspecific. No acute osseous findings are seen. However there is multilevel lumbar degenerative disc disease and lower lumbar facet arthrosis.       Patient is status post prior aortobiiliac endograft stenting. 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 The left external iliac artery is also occluded. However there is reconstitution of flow at the left common femoral artery due to pelvic collaterals.       There is a partially calcified mass within the rightward inferior urinary bladder on axial image 155.  This is concerning for a bladder artery demonstrates multifocal areas of high-grade stenosis which are significantly flow-limiting. In addition, the left popliteal artery is very diminutive in caliber with multifocal areas of high-grade stenosis. There is    opacification of the tibial peroneal trunk. However there appears to be occlusion proximally and the anterior tibial artery appears occluded proximally. No runoff vessels are seen crossing the left ankle distally.       4. The right superficial femoral artery appears very diminutive in caliber demonstrating multifocal areas of high-grade stenosis. The distal right SFA appears occluded.        5. The proximal right popliteal artery also appears occluded. However there is reconstitution of flow by collateral vessels at its above-the-knee segment. The remaining popliteal artery is very diminutive in caliber.        6. There is three-vessel runoff initially on the right. However there is occlusion of the right anterior tibial artery distally. The dorsalis pedis artery is very small in caliber as it crosses the right ankle distally.       7. Limited evaluation of the lung bases demonstrates mild dependent right basilar airspace disease which may represent atelectasis or pneumonia. There are also mild patchy groundglass opacities demonstrated at the visualized inferior lateral left upper    lobe which may also represent an inflammatory or infectious process.       8. There are small fluid collections demonstrated adjacent to the common femoral arteries bilaterally. These may represent postoperative seromas.           **This report has been created using voice recognition software.  It may contain minor errors which are inherent in voice recognition technology. **       Final report electronically signed by Dr. Waldo Keene on 3/10/2021 3:26 PM           Intake/Output Summary (Last 24 hours) at 3/11/2021 2956  Last data filed at 3/11/2021 0332  Gross per 24 hour   Intake 10 ml   Output 0 ml   Net 10 ml Scheduled Meds:    sodium chloride flush  10 mL Intravenous 2 times per day    [Held by provider] heparin (porcine)  80 Units/kg Intravenous Once    ascorbic acid  500 mg Oral Daily    atorvastatin  20 mg Oral Daily    metoprolol succinate  50 mg Oral Daily    mirtazapine  7.5 mg Oral Nightly         PastMedical History:  Deya Laureano  has a past medical history of Atrial fibrillation ([address] Cancer ([facility] [address] and Pneumonia. Past Surgical History:  The patient  has a past surgical history that includes Skin cancer excision and Prostate surgery. Allergies: The patient has No Known Allergies. Family History: This patient's family history includes Diabetes in his mother; Heart Attack in his father; Heart Disease in his father. Social History:  Deya Laureano  reports that he has quit smoking. His smoking use included cigarettes. He has quit using smokeless tobacco. He reports current alcohol use. He reports that he does not use drugs. ROS:  Constitutional: Negative for chills, fatigue, fever and unexpected weight change. HENT: Negative for congestion, facial swelling, sore throat, and changes in voice. Eyes: Negative for photophobia, redness, itching and visual disturbance. Respiratory: Negative for apnea, choking, shortness of breath, wheezing and stridor. Cardiovascular: Negative for chest pain, palpitations and leg swelling. Gastrointestinal: Negative for abdominal distention, constipation, nausea and vomiting. Endocrine: Negative for cold intolerance, heat intolerance, polyphagia and polyuria. Skin: Positive for LLE coldness   Allergic/Immunologic: Negative for food allergies and immunocompromised state. Neurological: Negative for dizziness, tremors, speech difficulty, weakness, numbness and headaches. Hematological: Negative for adenopathy. Does not bruise/bleed easily. Psychiatric/Behavioral: Negative for agitation, confusion, and dysphoric mood.      Physical Exam:   General appearance:  No apparent distress, appears stated age and cooperative. HEENT:  Normal cephalic, atraumatic without obvious deformity. Conjunctivae/corneas clear. Neck: Supple, with full range of motion. No jugular venous distention. Trachea midline. Respiratory:  Normal respiratory effort. Clear to auscultation, bilaterally without rales/wheezes/rhonchi. Cardiovascular:  Regular rate and rhythm with normal S1/S2 without murmurs, rubs or gallops. Abdomen: Soft, non-tender, non-distended with normal bowel sounds. Musculoskeletal:    Skin: LLE COLD  Neurologic:  NO SENSATION IN LLE   Psychiatric:  Alert and oriented, thought content appropriate, normal insight. Peripheral Pulses: NO DOPPLER LLE DP OR PT PULSES     Active Problem List  Patient Active Problem List   Diagnosis    PVD (peripheral vascular disease) ([facility] [address]       Assessment:   PVD  Ischemic LLE    Plan: 3/11/21  1. Complex case- the pt's LLE is cold with no sensation or doppler pulses. After long discussion with the pt and the pt's wife they have changed their mind on code status and want everything done to save the pt's leg. Code status is going to be changed to full code. Case was discussed with Dr. Joe Felton. Recommendation would be life flight to [place] for urgent/emergent revascularization. The plan of care was discussed in detail with Dr. Aydin Robles discussed in detail with the patient, who understands and has no further questions. Time spent with patient: 80 minutes, of which more than 50% was spent counseling/coordinating the patient's care.     Mónica Burr PA-C

## 2021-03-11 NOTE — PROGRESS NOTES
Hospitalist progress note      Patient:  Parul Lund  YOB: 1934    MRN: [id number]     [place]: [de-identified]    PCP: No primary care provider on file. Date of Admission: 3/10/2021    Date of Service: Pt seen/examined on 03/11/21  and Admitted to Inpatient with expected LOS greater than two midnights due to medical therapy. ASSESSMENT/PLAN:    1. LLE weakness/numbness/ischemic left foot--history of PVD status post aortofemoral bypass -[facility] and initially wanted the patient transferred to [facility] however  patient is a comfort care and [facility] was okay with a heparin drip and seeing him here and proceeding accordingly; I spoke with pharmacy and his INR is at 2.51 so we will hold on the heparin and recheck INR    3/11-discussed the case with the interventional cardiologists, and cardiovascular surgery team, surgery team would like the patient to be transferred to [facility] for further care given the complexity of the issue. Patient and family now wants to be full code-INR is therapeutic however as per the advice from the vascular surgeons from [facility] heparin drip should be continued without bolus will initiate this. 2. Atrial Fib with RVR--rate controlled-off Cardizem drip on lopressor, chronic Coumadin therapy with therapeutic INR; Coumadin on hold currently however INR is still therapeutic-discussed the case with the vascular surgeons in [facility] advised to continue heparin drip without any bolus. 3.  UTI and leukocytosis-no sepsis-start ceftriaxone-check urine cultures    4. New bladder mass that is found on the CT of the abdomen scanning likely incidental-we will need urology to follow-up for biopsy-patient is leaving to [facility] soon for the leg may see urologist at that center.   4. CODE STATUS--family and patient wanting to be full code    Transfer to [facility] vascular surgeon initiated-spoke to Dr Reina Rendon and Dr uRfino Olivarez- vascular surgeons down in [facility], accepted reduce radiation dose to as low as reasonably achievable. FINDINGS: Generalized cerebral volume loss. Remote left cerebellar infarct. Mild periventricular diminished white matter attenuation. Ventricles are normal. No hemorrhage or extra-axial collection. No acute infarction is seen. Calvarium is intact. This was paranasal sinuses and mastoid air cells are clear. No acute process. Chronic changes detailed above **This report has been created using voice recognition software. It may contain minor errors which are inherent in voice recognition technology. ** Final report electronically signed by Dr. Riki Blevins on 3/10/2021 1:07 PM    Cta Abdominal Aorta W Bilat Runoff W Wo Contrast    Result Date: 3/10/2021  PROCEDURE: CTA ABDOMINAL AORTA W BILAT RUNOFF W WO CONTRAST CLINICAL INFORMATION: LLE cold COMPARISON: None TECHNIQUE: 1.5 mm axial images were obtained through the chest after the administration of IV contrast.  A non-contrast localizer was obtained. 3D reconstructions were performed on the scanner to include oblique coronal MIP images through the abdomen, pelvis, lower extremities, and the lung bases. . All CT scans at this facility use dose modulation, iterative reconstruction, and/or weight-based dosing when appropriate to reduce radiation dose to as low as reasonably achievable. FINDINGS: Limited evaluation of the lung bases demonstrates mild dependent right basilar airspace disease which may represent atelectasis or pneumonia. There are also mild patchy groundglass opacities demonstrated at the visualized inferior lateral left upper lobe  which may also represent an inflammatory or infectious process. The liver, gallbladder, pancreas and adrenal glands appear normal. There is heterogeneous enhancement of the spleen, likely related to phase of contrast enhancement. There is nonspecific fluid demonstrated along the inferior pole of the right kidney. This may in part be related to motion.  However, cannot right. However there is occlusion of the right anterior tibial artery distally. The dorsalis pedis artery is very small in caliber as it crosses the right ankle distally. No lymphadenopathy is seen. There is no free air. There is no free fluid. There are small fluid collections demonstrated adjacent to the common femoral arteries bilaterally. These may represent postoperative seromas. 1.Patient is status post prior aortobiiliac endograft stenting. 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 The left external iliac artery is also occluded. However there is reconstitution of flow at the left common femoral artery due to pelvic collaterals. 2. There is a partially calcified mass within the rightward inferior urinary bladder on axial image 155. This is concerning for a bladder neoplasm. Recommend clinical correlation. 3. The left superficial femoral artery demonstrates multifocal areas of high-grade stenosis which are significantly flow-limiting. In addition, the left popliteal artery is very diminutive in caliber with multifocal areas of high-grade stenosis. There is  opacification of the tibial peroneal trunk. However there appears to be occlusion proximally and the anterior tibial artery appears occluded proximally. No runoff vessels are seen crossing the left ankle distally. 4. The right superficial femoral artery appears very diminutive in caliber demonstrating multifocal areas of high-grade stenosis. The distal right SFA appears occluded. 5. The proximal right popliteal artery also appears occluded. However there is reconstitution of flow by collateral vessels at its above-the-knee segment. The remaining popliteal artery is very diminutive in caliber. 6. There is three-vessel runoff initially on the right. However there is occlusion of the right anterior tibial artery distally. The dorsalis pedis artery is very small in caliber as it crosses the right ankle distally.  7. Limited evaluation of the lung bases

## 2021-03-11 NOTE — PROGRESS NOTES
Discussed with Dr. Francine Correa. Patient now wishing for intervention for left ischemic leg after cardiovascular consult. Plan is for transfer to [place]. There is a new bladder mass found on CT scan on admission- patient/family are unaware of this at this time. Patient resting in bed at time of visit. Patient is pale and appears malnourished- BMI 18. Discussed with primary RN Maria Luisa Riddle- she will call if palliative care is needed.

## 2021-03-11 NOTE — PLAN OF CARE
Problem: Nutrition  Goal: Optimal nutrition therapy  Outcome: Ongoing   Nutrition Problem #1: Severe malnutrition, In context of chronic illness  Intervention: Food and/or Nutrient Delivery: Continue Current Diet, Start Oral Nutrition Supplement  Nutritional Goals: Patient will consume 75% or more of meals during LOS.

## 2021-03-11 NOTE — PROGRESS NOTES
[address]- Wife updated that patient would being transferred to [facility] in [facility] shortly. She spoke with Geneva Delatorre on the phone prior to him being transferred. [phone]- Report called to Jimmy Esqueda at [facility] in [facility]. [phone]- Wife called and updated on visitor policy.

## 2021-03-11 NOTE — DISCHARGE SUMMARY
on the right. However there is occlusion of the right anterior tibial artery    distally. The dorsalis pedis artery is very small in caliber as it crosses the right ankle distally.       No lymphadenopathy is seen. There is no free air. There is no free fluid.       There are small fluid collections demonstrated adjacent to the common femoral arteries bilaterally. These may represent postoperative seromas.           Impression   1. Patient is status post prior aortobiiliac endograft stenting. 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 The left external iliac artery is also occluded. However there is reconstitution of flow at the left common femoral artery due to pelvic    collaterals.       2. There is a partially calcified mass within the rightward inferior urinary bladder on axial image 155. This is concerning for a bladder neoplasm. Recommend clinical correlation.       3. The left superficial femoral artery demonstrates multifocal areas of high-grade stenosis which are significantly flow-limiting. In addition, the left popliteal artery is very diminutive in caliber with multifocal areas of high-grade stenosis. There is    opacification of the tibial peroneal trunk. However there appears to be occlusion proximally and the anterior tibial artery appears occluded proximally. No runoff vessels are seen crossing the left ankle distally.       4. The right superficial femoral artery appears very diminutive in caliber demonstrating multifocal areas of high-grade stenosis. The distal right SFA appears occluded.        5. The proximal right popliteal artery also appears occluded. However there is reconstitution of flow by collateral vessels at its above-the-knee segment. The remaining popliteal artery is very diminutive in caliber.        6. There is three-vessel runoff initially on the right. However there is occlusion of the right anterior tibial artery distally.  The dorsalis pedis artery is very small in caliber as it crosses the right ankle distally.       7. Limited evaluation of the lung bases demonstrates mild dependent right basilar airspace disease which may represent atelectasis or pneumonia. There are also mild patchy groundglass opacities demonstrated at the visualized inferior lateral left upper    lobe which may also represent an inflammatory or infectious process.       8. There are small fluid collections demonstrated adjacent to the common femoral arteries bilaterally. These may represent postoperative seromas.           **This report has been created using voice recognition software.  It may contain minor errors which are inherent in voice recognition technology. **       Final report electronically signed by Dr. Rosa Del Angel on 3/10/2021 3:26 PM           Vital Signs: BP (!) 94/49   Pulse 73   Temp 96.4 °F (35.8 °C) (Axillary)   Resp 16   Ht 6' 2\" (1.88 m)   Wt 142 lb 1.6 oz (64.5 kg)   SpO2 92%   BMI 18.24 kg/m²    Temp (24hrs), Av.7 °F (36.5 °C), Min:96.4 °F (35.8 °C), Max:98.7 °F (37.1 °C)      PULSE OXIMETRY RANGE: SpO2  Av.7 %  Min: 90 %  Max: 98 %    SUPPLEMENTAL O2:       Labs:   CBC:     Recent Labs     03/10/21  1313 03/10/21  1744 03/10/21  1824 03/10/21  2250 21  0431   WBC 28.5* 25.1*  --   --   --    HGB 11.7* 11.9*  --   --   --    HCT 37.9* 39.3*  --   --   --    MCV 94.8* 96.3*  --   --   --     191  --   --   --    APTT  --   --  36.2 34.2 36.6   INR 2.51*  --   --   --  2.23*     BMP:   Recent Labs     03/10/21  1313      K 5.5*      CO2 19*   BUN 23*   CREATININE 1.2     Last HgA1C: No results found for: LABA1C    Imaging:  CTA: I have reviewed the images  Narrative   PROCEDURE: CTA ABDOMINAL AORTA W BILAT RUNOFF W WO CONTRAST       CLINICAL INFORMATION: LLE cold       COMPARISON: None       TECHNIQUE: 1.5 mm axial images were obtained through the chest after the administration of IV contrast.  A non-contrast localizer was obtained.  3D reconstructions were performed on the scanner to include oblique coronal MIP images through the abdomen,    pelvis, lower extremities, and the lung bases. .       All CT scans at this facility use dose modulation, iterative reconstruction, and/or weight-based dosing when appropriate to reduce radiation dose to as low as reasonably achievable.       FINDINGS:       Limited evaluation of the lung bases demonstrates mild dependent right basilar airspace disease which may represent atelectasis or pneumonia. There are also mild patchy groundglass opacities demonstrated at the visualized inferior lateral left upper lobe    which may also represent an inflammatory or infectious process.        The liver, gallbladder, pancreas and adrenal glands appear normal. There is heterogeneous enhancement of the spleen, likely related to phase of contrast enhancement.       There is nonspecific fluid demonstrated along the inferior pole of the right kidney. This may in part be related to motion. However, cannot exclude an exophytic cystic lesion. This is not well characterized on the current examination. No hydronephrosis    or hydroureter is seen.       There is no evidence of bowel obstruction. The appendix appears normal. There is no free air. There is a moderate amount retained stool within the rectum.       No acute osseous findings are seen.       Calcified plaques are demonstrated at the right lung base which is nonspecific. No acute osseous findings are seen. However there is multilevel lumbar degenerative disc disease and lower lumbar facet arthrosis.       Patient is status post prior aortobiiliac endograft stenting. 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 The left external iliac artery is also occluded. However there is reconstitution of flow at the left common femoral artery due to pelvic collaterals.       There is a partially calcified mass within the rightward inferior urinary bladder on axial image 155.  This is concerning for a bladder neoplasm. Recommend clinical correlation.       The left superficial femoral artery demonstrates multifocal areas of high-grade stenosis which are significantly flow-limiting. In addition, the popliteal artery is very diminutive in caliber with multifocal areas of high-grade stenosis. There is    opacification of the tibial peroneal trunk. However there appears to be occlusion of the tibial peroneal trunk proximally and the anterior tibial artery appears occluded proximally as well.       The right iliac limb appears patent. The right external iliac artery and common femoral artery appear patent.       The right superficial femoral artery appears very diminutive in caliber demonstrating multifocal areas of high-grade stenosis. The distal right SFA appears occluded. The proximal right popliteal artery also appears occluded. However there is    reconstitution of flow by collateral vessels at its above-the-knee segment. The remaining popliteal artery is very diminutive in caliber. There is three-vessel runoff initially on the right. However there is occlusion of the right anterior tibial artery    distally. The dorsalis pedis artery is very small in caliber as it crosses the right ankle distally.       No lymphadenopathy is seen. There is no free air. There is no free fluid.       There are small fluid collections demonstrated adjacent to the common femoral arteries bilaterally. These may represent postoperative seromas.           Impression   1. Patient is status post prior aortobiiliac endograft stenting. 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 The left external iliac artery is also occluded. However there is reconstitution of flow at the left common femoral artery due to pelvic    collaterals.       2. There is a partially calcified mass within the rightward inferior urinary bladder on axial image 155. This is concerning for a bladder neoplasm. Recommend clinical correlation.       3.  The left superficial femoral artery demonstrates multifocal areas of high-grade stenosis which are significantly flow-limiting. In addition, the left popliteal artery is very diminutive in caliber with multifocal areas of high-grade stenosis. There is    opacification of the tibial peroneal trunk. However there appears to be occlusion proximally and the anterior tibial artery appears occluded proximally. No runoff vessels are seen crossing the left ankle distally.       4. The right superficial femoral artery appears very diminutive in caliber demonstrating multifocal areas of high-grade stenosis. The distal right SFA appears occluded.        5. The proximal right popliteal artery also appears occluded. However there is reconstitution of flow by collateral vessels at its above-the-knee segment. The remaining popliteal artery is very diminutive in caliber.        6. There is three-vessel runoff initially on the right. However there is occlusion of the right anterior tibial artery distally. The dorsalis pedis artery is very small in caliber as it crosses the right ankle distally.       7. Limited evaluation of the lung bases demonstrates mild dependent right basilar airspace disease which may represent atelectasis or pneumonia. There are also mild patchy groundglass opacities demonstrated at the visualized inferior lateral left upper    lobe which may also represent an inflammatory or infectious process.       8. There are small fluid collections demonstrated adjacent to the common femoral arteries bilaterally. These may represent postoperative seromas.           **This report has been created using voice recognition software.  It may contain minor errors which are inherent in voice recognition technology. **       Final report electronically signed by Dr. Everett Hartmann on 3/10/2021 3:26 PM           Intake/Output Summary (Last 24 hours) at 3/11/2021 0938  Last data filed at 3/11/2021 0332  Gross per 24 hour   Intake 10 ml   Output 0 ml   Net 10 ml Scheduled Meds:    sodium chloride flush  10 mL Intravenous 2 times per day    [Held by provider] heparin (porcine)  80 Units/kg Intravenous Once    ascorbic acid  500 mg Oral Daily    atorvastatin  20 mg Oral Daily    metoprolol succinate  50 mg Oral Daily    mirtazapine  7.5 mg Oral Nightly         PastMedical History:  [facility]  has a past medical history of Atrial fibrillation ([address] Cancer ([address] and Pneumonia. Past Surgical History:  The patient  has a past surgical history that includes Skin cancer excision and Prostate surgery. Allergies: The patient has No Known Allergies. Family History: This patient's family history includes Diabetes in his mother; Heart Attack in his father; Heart Disease in his father. Social History:  [facility]  reports that he has quit smoking. His smoking use included cigarettes. He has quit using smokeless tobacco. He reports current alcohol use. He reports that he does not use drugs. ROS:  Constitutional: Negative for chills, fatigue, fever and unexpected weight change. HENT: Negative for congestion, facial swelling, sore throat, and changes in voice. Eyes: Negative for photophobia, redness, itching and visual disturbance. Respiratory: Negative for apnea, choking, shortness of breath, wheezing and stridor. Cardiovascular: Negative for chest pain, palpitations and leg swelling. Gastrointestinal: Negative for abdominal distention, constipation, nausea and vomiting. Endocrine: Negative for cold intolerance, heat intolerance, polyphagia and polyuria. Skin: Positive for LLE coldness   Allergic/Immunologic: Negative for food allergies and immunocompromised state. Neurological: Negative for dizziness, tremors, speech difficulty, weakness, numbness and headaches. Hematological: Negative for adenopathy. Does not bruise/bleed easily. Psychiatric/Behavioral: Negative for agitation, confusion, and dysphoric mood.      Physical Exam:   General

## 2021-03-11 NOTE — ANESTHESIA PRE PROCEDURE
Department of Anesthesiology  Preprocedure Note       Name:  Axel Howard   Age:  80 y.o.  :  1934                                          MRN:  [id number]         Date:  3/11/2021      Surgeon: Edi Rowe): Sultana Varela MD    Procedure: Procedure(s):  THROMBECTOMY, POSSIBLE FEM-FEM BYPASS    Medications prior to admission:   Prior to Admission medications    Medication Sig Start Date End Date Taking?  Authorizing Provider   Magnesium Oxide (MAG- PO) Take 400 mg by mouth daily  21   Historical Provider, MD   metoprolol succinate (TOPROL XL) 50 MG extended release tablet Take 50 mg by mouth daily 21   Historical Provider, MD   potassium & sodium phosphates (PHOS-NAK) 280-160-250 MG PACK Take 1 packet by mouth 3 times daily 21   Historical Provider, MD   ascorbic acid (VITAMIN C) 500 MG tablet Take 500 mg by mouth daily 21   Historical Provider, MD   atorvastatin (LIPITOR) 20 MG tablet Take 20 mg by mouth daily 21   Historical Provider, MD   mirtazapine (REMERON) 7.5 MG tablet Take 7.5 mg by mouth nightly 20   Historical Provider, MD   warfarin (COUMADIN) 4 MG tablet Take 1 tablet by mouth daily  3/17/20   Historical Provider, MD   Wound Dressings (HYDROGEL) GEL Apply 1 applicator topically daily as needed (for heel wound)    Historical Provider, MD       Current medications:    Current Facility-Administered Medications   Medication Dose Route Frequency Provider Last Rate Last Admin    iodixanol (VISIPAQUE) 320 MG/ML injection             gelatin adsorbable (GELFOAM) 100 sponge             heparin (porcine) 1000 UNIT/ML injection             thrombin 5000 units kit                Allergies:  No Known Allergies    Problem List:    Patient Active Problem List   Diagnosis Code    PVD (peripheral vascular disease) ([facility] I73.9    Severe malnutrition ([facility] E43    Ischemic leg I99.8       Past Medical History:        Diagnosis Date    Atrial fibrillation ([facility]  Cancer ([facility]     skin (neck) removed    Pneumonia        Past Surgical History:        Procedure Laterality Date    PROSTATE SURGERY      SKIN CANCER EXCISION       Aorto-bililiac stenting        Social History:    Social History     Tobacco Use    Smoking status: Former Smoker     Types: Cigarettes    Smokeless tobacco: Former User   Substance Use Topics    Alcohol use: Yes     Comment: rarely                                Counseling given: Not Answered      Vital Signs (Current):   Vitals:    03/11/21 1530 03/11/21 1538   BP: 132/82    Pulse: 104    Resp: 18    Temp: 98.2 °F (36.8 °C)    TempSrc: Oral    SpO2: 96% 95%   Weight: 142 lb (64.4 kg)    Height: 6' 2.02\" (1.88 m)                                               BP Readings from Last 3 Encounters:   03/11/21 132/82   03/11/21 (!) 99/49       NPO Status:                                                                                 BMI:   Wt Readings from Last 3 Encounters:   03/11/21 142 lb (64.4 kg)   03/11/21 142 lb 1.6 oz (64.5 kg)     Body mass index is 18.22 kg/m². CBC:   Lab Results   Component Value Date    WBC 25.1 03/10/2021    RBC 4.08 03/10/2021    HGB 11.9 03/10/2021    HCT 39.3 03/10/2021    MCV 96.3 03/10/2021     03/10/2021       CMP:   Lab Results   Component Value Date     03/10/2021    K 5.5 03/10/2021     03/10/2021    CO2 19 03/10/2021    BUN 23 03/10/2021    CREATININE 1.2 03/10/2021    LABGLOM 57 03/10/2021    GLUCOSE 133 03/10/2021    PROT 7.3 03/10/2021    CALCIUM 8.7 03/10/2021    BILITOT 1.0 03/10/2021    ALKPHOS 67 03/10/2021    AST 18 03/10/2021    ALT 8 03/10/2021       POC Tests: No results for input(s): POCGLU, POCNA, POCK, POCCL, POCBUN, POCHEMO, POCHCT in the last 72 hours.     Coags:   Lab Results   Component Value Date    INR 2.23 03/11/2021    APTT 37.6 03/11/2021       HCG (If Applicable): No results found for: PREGTESTUR, PREGSERUM, HCG, HCGQUANT     ABGs: No results found for: PHART, PO2ART, AON8XDO, MXR1PKX, BEART, D1TNYGYU     Type & Screen (If Applicable):  No results found for: LABABO, LABRH    Drug/Infectious Status (If Applicable):  No results found for: HIV, HEPCAB    COVID-19 Screening (If Applicable): No results found for: COVID19    EKG 3/10/21    Atrial fibrillation with rapid ventricular response with premature ventricular or aberrantly conducted complexes  Indeterminate axis  Pulmonary disease pattern  Marked ST abnormality, possible inferior subendocardial injury  Marked ST abnormality, possible anterolateral subendocardial injury  Abnormal ECG  No previous ECGs available       Anesthesia Evaluation    Airway: Mallampati: III  TM distance: >3 FB   Neck ROM: full  Mouth opening: > = 3 FB Dental:    (+) upper dentures      Pulmonary:   (+) pneumonia:  decreased breath sounds,                             Cardiovascular:    (+) dysrhythmias: atrial fibrillation,         Rhythm: irregular  Rate: abnormal                    Neuro/Psych:   (+) neuromuscular disease:,             GI/Hepatic/Renal:             Endo/Other:                     Abdominal:   (+) scaphoid        Vascular:   + PVD, aortic or cerebral, . Anesthesia Plan      general     ASA 4 - emergent     (GETA  Was COVID +  Several weeks ago. Was in hospital 2-3 days according to patient. Awaiting repeat COVID test     )  Induction: intravenous. arterial line  MIPS: Postoperative opioids intended, Postoperative trial extubation and Postoperative ventilation. Anesthetic plan and risks discussed with patient. Use of blood products discussed with patient whom consented to blood products. Plan discussed with CRNA.                   Sheryl Fischer MD   3/11/2021

## 2021-03-12 LAB
ABO/RH: NORMAL
ANION GAP SERPL CALCULATED.3IONS-SCNC: 10 MMOL/L (ref 9–17)
ANTIBODY SCREEN: NEGATIVE
ARM BAND NUMBER: NORMAL
BLD PROD TYP BPU: NORMAL
BLD PROD TYP BPU: NORMAL
BUN BLDV-MCNC: 25 MG/DL (ref 8–23)
BUN/CREAT BLD: ABNORMAL (ref 9–20)
CALCIUM IONIZED: 1.12 MMOL/L (ref 1.13–1.33)
CALCIUM SERPL-MCNC: 8.4 MG/DL (ref 8.6–10.4)
CHLORIDE BLD-SCNC: 104 MMOL/L (ref 98–107)
CO2: 23 MMOL/L (ref 20–31)
CREAT SERPL-MCNC: 1 MG/DL (ref 0.7–1.2)
CROSSMATCH RESULT: NORMAL
CROSSMATCH RESULT: NORMAL
DISPENSE STATUS BLOOD BANK: NORMAL
DISPENSE STATUS BLOOD BANK: NORMAL
EKG ATRIAL RATE: 267 BPM
EKG Q-T INTERVAL: 334 MS
EKG QRS DURATION: 78 MS
EKG QTC CALCULATION (BAZETT): 433 MS
EKG R AXIS: 63 DEGREES
EKG T AXIS: 102 DEGREES
EKG VENTRICULAR RATE: 101 BPM
EXPIRATION DATE: NORMAL
GFR AFRICAN AMERICAN: >60 ML/MIN
GFR NON-AFRICAN AMERICAN: >60 ML/MIN
GFR SERPL CREATININE-BSD FRML MDRD: ABNORMAL ML/MIN/{1.73_M2}
GFR SERPL CREATININE-BSD FRML MDRD: ABNORMAL ML/MIN/{1.73_M2}
GLUCOSE BLD-MCNC: 148 MG/DL (ref 70–99)
HCT VFR BLD CALC: 29.8 % (ref 40.7–50.3)
HEMOGLOBIN: 9.6 G/DL (ref 13–17)
INR BLD: 1.7
LACTIC ACID, WHOLE BLOOD: 1.1 MMOL/L (ref 0.7–2.1)
MAGNESIUM: 1.9 MG/DL (ref 1.6–2.6)
MYOGLOBIN: 284 NG/ML (ref 28–72)
MYOGLOBIN: 401 NG/ML (ref 28–72)
ORGANISM: ABNORMAL
PARTIAL THROMBOPLASTIN TIME: 48.1 SEC (ref 20.5–30.5)
PARTIAL THROMBOPLASTIN TIME: 51.9 SEC (ref 20.5–30.5)
PARTIAL THROMBOPLASTIN TIME: 88.2 SEC (ref 20.5–30.5)
PHOSPHORUS: 1.8 MG/DL (ref 2.5–4.5)
POTASSIUM SERPL-SCNC: 4.4 MMOL/L (ref 3.7–5.3)
PROTHROMBIN TIME: 17.5 SEC (ref 9.1–12.3)
SODIUM BLD-SCNC: 137 MMOL/L (ref 135–144)
TOTAL CK: 1065 U/L (ref 39–308)
TOTAL CK: 788 U/L (ref 39–308)
TRANSFUSION STATUS: NORMAL
TRANSFUSION STATUS: NORMAL
UNIT DIVISION: 0
UNIT DIVISION: 0
UNIT NUMBER: NORMAL
UNIT NUMBER: NORMAL
URINE CULTURE REFLEX: ABNORMAL

## 2021-03-12 PROCEDURE — 2580000003 HC RX 258: Performed by: STUDENT IN AN ORGANIZED HEALTH CARE EDUCATION/TRAINING PROGRAM

## 2021-03-12 PROCEDURE — 6370000000 HC RX 637 (ALT 250 FOR IP): Performed by: STUDENT IN AN ORGANIZED HEALTH CARE EDUCATION/TRAINING PROGRAM

## 2021-03-12 PROCEDURE — 85014 HEMATOCRIT: CPT

## 2021-03-12 PROCEDURE — 94761 N-INVAS EAR/PLS OXIMETRY MLT: CPT

## 2021-03-12 PROCEDURE — 82550 ASSAY OF CK (CPK): CPT

## 2021-03-12 PROCEDURE — 99222 1ST HOSP IP/OBS MODERATE 55: CPT | Performed by: INTERNAL MEDICINE

## 2021-03-12 PROCEDURE — 6360000002 HC RX W HCPCS: Performed by: STUDENT IN AN ORGANIZED HEALTH CARE EDUCATION/TRAINING PROGRAM

## 2021-03-12 PROCEDURE — 83605 ASSAY OF LACTIC ACID: CPT

## 2021-03-12 PROCEDURE — 84100 ASSAY OF PHOSPHORUS: CPT

## 2021-03-12 PROCEDURE — 2500000003 HC RX 250 WO HCPCS: Performed by: STUDENT IN AN ORGANIZED HEALTH CARE EDUCATION/TRAINING PROGRAM

## 2021-03-12 PROCEDURE — 85018 HEMOGLOBIN: CPT

## 2021-03-12 PROCEDURE — 2000000000 HC ICU R&B

## 2021-03-12 PROCEDURE — 93010 ELECTROCARDIOGRAM REPORT: CPT | Performed by: INTERNAL MEDICINE

## 2021-03-12 PROCEDURE — 99222 1ST HOSP IP/OBS MODERATE 55: CPT | Performed by: NURSE PRACTITIONER

## 2021-03-12 PROCEDURE — 85610 PROTHROMBIN TIME: CPT

## 2021-03-12 PROCEDURE — 85730 THROMBOPLASTIN TIME PARTIAL: CPT

## 2021-03-12 PROCEDURE — 82330 ASSAY OF CALCIUM: CPT

## 2021-03-12 PROCEDURE — 83735 ASSAY OF MAGNESIUM: CPT

## 2021-03-12 PROCEDURE — 80048 BASIC METABOLIC PNL TOTAL CA: CPT

## 2021-03-12 PROCEDURE — 83874 ASSAY OF MYOGLOBIN: CPT

## 2021-03-12 RX ORDER — SODIUM CHLORIDE, SODIUM LACTATE, POTASSIUM CHLORIDE, CALCIUM CHLORIDE 600; 310; 30; 20 MG/100ML; MG/100ML; MG/100ML; MG/100ML
INJECTION, SOLUTION INTRAVENOUS CONTINUOUS
Status: DISCONTINUED | OUTPATIENT
Start: 2021-03-12 | End: 2021-03-12

## 2021-03-12 RX ORDER — WARFARIN SODIUM 3 MG/1
6 TABLET ORAL ONCE
Status: DISCONTINUED | OUTPATIENT
Start: 2021-03-12 | End: 2021-03-15 | Stop reason: HOSPADM

## 2021-03-12 RX ORDER — NOREPINEPHRINE BIT/0.9 % NACL 16MG/250ML
2-100 INFUSION BOTTLE (ML) INTRAVENOUS CONTINUOUS
Status: DISCONTINUED | OUTPATIENT
Start: 2021-03-12 | End: 2021-03-13

## 2021-03-12 RX ORDER — MAGNESIUM SULFATE 1 G/100ML
1000 INJECTION INTRAVENOUS ONCE
Status: COMPLETED | OUTPATIENT
Start: 2021-03-12 | End: 2021-03-12

## 2021-03-12 RX ORDER — MIDODRINE HYDROCHLORIDE 5 MG/1
10 TABLET ORAL
Status: DISCONTINUED | OUTPATIENT
Start: 2021-03-12 | End: 2021-03-15 | Stop reason: HOSPADM

## 2021-03-12 RX ORDER — DIGOXIN 0.25 MG/ML
250 INJECTION INTRAMUSCULAR; INTRAVENOUS ONCE
Status: COMPLETED | OUTPATIENT
Start: 2021-03-12 | End: 2021-03-12

## 2021-03-12 RX ADMIN — MIDODRINE HYDROCHLORIDE 10 MG: 5 TABLET ORAL at 12:27

## 2021-03-12 RX ADMIN — CALCIUM GLUCONATE 1000 MG: 20 INJECTION, SOLUTION INTRAVENOUS at 03:16

## 2021-03-12 RX ADMIN — CEFTRIAXONE SODIUM 1000 MG: 1 INJECTION, POWDER, FOR SOLUTION INTRAMUSCULAR; INTRAVENOUS at 20:04

## 2021-03-12 RX ADMIN — ACETAMINOPHEN 1000 MG: 500 TABLET ORAL at 03:15

## 2021-03-12 RX ADMIN — HEPARIN SODIUM 2580 UNITS: 1000 INJECTION INTRAVENOUS; SUBCUTANEOUS at 06:40

## 2021-03-12 RX ADMIN — SODIUM PHOSPHATE, MONOBASIC, MONOHYDRATE 20 MMOL: 276; 142 INJECTION, SOLUTION INTRAVENOUS at 12:25

## 2021-03-12 RX ADMIN — FAMOTIDINE 20 MG: 10 INJECTION INTRAVENOUS at 20:04

## 2021-03-12 RX ADMIN — MAGNESIUM SULFATE HEPTAHYDRATE 1000 MG: 1 INJECTION, SOLUTION INTRAVENOUS at 08:38

## 2021-03-12 RX ADMIN — DIGOXIN 250 MCG: 0.25 INJECTION INTRAMUSCULAR; INTRAVENOUS at 03:15

## 2021-03-12 RX ADMIN — FAMOTIDINE 20 MG: 10 INJECTION INTRAVENOUS at 09:28

## 2021-03-12 RX ADMIN — SODIUM CHLORIDE, PRESERVATIVE FREE 10 ML: 5 INJECTION INTRAVENOUS at 20:04

## 2021-03-12 RX ADMIN — MAGNESIUM SULFATE HEPTAHYDRATE 1000 MG: 1 INJECTION, SOLUTION INTRAVENOUS at 04:37

## 2021-03-12 RX ADMIN — ENOXAPARIN SODIUM 70 MG: 80 INJECTION SUBCUTANEOUS at 20:04

## 2021-03-12 RX ADMIN — Medication 2 MCG/MIN: at 19:15

## 2021-03-12 RX ADMIN — HEPARIN SODIUM 20 UNITS/KG/HR: 10000 INJECTION, SOLUTION INTRAVENOUS at 15:06

## 2021-03-12 RX ADMIN — SODIUM CHLORIDE, POTASSIUM CHLORIDE, SODIUM LACTATE AND CALCIUM CHLORIDE: 600; 310; 30; 20 INJECTION, SOLUTION INTRAVENOUS at 04:51

## 2021-03-12 RX ADMIN — HEPARIN SODIUM 2580 UNITS: 1000 INJECTION INTRAVENOUS; SUBCUTANEOUS at 12:33

## 2021-03-12 ASSESSMENT — PAIN SCALES - GENERAL: PAINLEVEL_OUTOF10: 0

## 2021-03-12 NOTE — PROGRESS NOTES
Physical Therapy    DATE: 3/12/2021    NAME: Hortencia Bruce  MRN: [id number]   : 1934      Patient not seen this date for Physical Therapy due to:     Other: Low BP      Electronically signed by Cordell Essex, PT on 3/12/2021 at 10:53 AM

## 2021-03-12 NOTE — H&P
History and Physical    PATIENT NAME: Parul Lund  AGE: 80 y.o. MEDICAL RECORD NO. [id number]  DATE: 3/11/2021  SURGEON: Dr Paulson Solid: No primary care provider on file. Patient evaluated at the request of  Dr. Reina Rendon    Reason for evaluation: postoperative surgical critical care management    IMPRESSION:     Patient Active Problem List   Diagnosis    [facility]per[facility] disease) [facility]    Severe malnutrition ([facility]    Ischemic leg    Iliac artery occlusion, left ([facility]    Status post abdominal aortic aneurysm (AAA) repair     POD#0 s/p left femoral artery cutdown and open thrombectomy of left iliac artery endograft limb, placement of left iliac limb extension    PLAN:     1. Neuro  1. Pain control - PO and IV prn for breakthrough  2. CV  1. A fib - will consult Cards  2. Repeat postoperative EKG  3. Continuous telemetry  4. Continue home beta blocker, consider amiodarone or cardizem if not controlled with home medications  5. Continue hep gtt  3. Pulm  1. Recent COVID positive and current rapid test positive  2. Repeat postoperative chest XR  3. Supplemental O2 as needed  4. GI  1. OK for diet from surgical standpoint  2. Nutritional supplements  5.   1. Concern for bladder mass and gross hematuria - Urology consulted  2. Maintain ivory catheter and monitor for clots, drainage  3. Please have  cart at bedside in case irrigation necessary  4. Repeat postoperative BMP  5. Serial CK and alisa  6. Heme  1. Repeat postoperative labs  2. Heparin gtt with PTT checks  7. ID  1. Leukocytosis and positive UA, COVID rapid positive - will consult ID  2. Continue rocephin  3. AM labs to monitor leukocytosis  8. Endo  1. Monitor blood sugars  9. Ppx  1. On hep gtt  2. OK for EPCs  10. Dispo  1.  Maintain ICU status      HISTORY:   History of Chief Complaint:    Parul Lund is a 80 y.o. male with history of a fib on coumadin, concern for bladder ca who presents as a transfer [facility] with occluded left iliac limb of endograft. Recent AAA repair with bilateral iliac limbs. Patient reports one day history of dysesthesia and poikilothermia to left leg prior to presentation. Delayed intervention due to code status changes. Patient recently COVID positive in early February but did not receive any treatment. UA positive at [facility] and initated on rocephin. Past Medical History   has a past medical history of Atrial fibrillation ([facility] [address] [facility] Cancer ([facility] [address] and Pneumonia. Past Surgical History   has a past surgical history that includes Skin cancer excision and Prostate surgery. Medications  Prior to Admission medications    Medication Sig Start Date End Date Taking? Authorizing Provider   Magnesium Oxide (MAG- PO) Take 400 mg by mouth daily  2/26/21   Historical Provider, MD   metoprolol succinate (TOPROL XL) 50 MG extended release tablet Take 50 mg by mouth daily 2/13/21   Historical Provider, MD   potassium & sodium phosphates (PHOS-NAK) 280-160-250 MG PACK Take 1 packet by mouth 3 times daily 2/26/21   Historical Provider, MD   ascorbic acid (VITAMIN C) 500 MG tablet Take 500 mg by mouth daily 2/21/21   Historical Provider, MD   atorvastatin (LIPITOR) 20 MG tablet Take 20 mg by mouth daily 2/21/21   Historical Provider, MD   mirtazapine (REMERON) 7.5 MG tablet Take 7.5 mg by mouth nightly 2/27/20   Historical Provider, MD   warfarin (COUMADIN) 4 MG tablet Take 1 tablet by mouth daily  3/17/20   Historical Provider, MD   Wound Dressings (HYDROGEL) GEL Apply 1 applicator topically daily as needed (for heel wound)    Historical Provider, MD    Scheduled Meds:   iodixanol        gelatin adsorbable        heparin (porcine)        thrombin        iodixanol         Continuous Infusions:  PRN Meds:. Allergies  has No Known Allergies. Family History  family history includes Diabetes in his mother; Heart Attack in his father; Heart Disease in his father.   Social History   reports that he has quit smoking. His smoking use included cigarettes. He has quit using smokeless tobacco.   reports current alcohol use. reports no history of drug use. Review of Systems  General Denies any fever or chills  HEENT  Denies any diplopia, tinnitus or vertigo  Resp Denies any shortness of breath, cough or wheezing  Cardiac + a fib  GI Denies any melena, hematochezia, hematemesis or pyrosis   + hematuria  Heme + bruising, on coumadin  Endocrine Denies any history of diabetes or thyroid disease  Neuro Denies any focal motor or sensory deficits    PHYSICAL:   VITALS:  height is 6' 2.02\" (1.88 m) and weight is 142 lb (64.4 kg). His oral temperature is 98.2 °F (36.8 °C). His blood pressure is 132/82 and his pulse is 103. His respiration is 18 and oxygen saturation is 93%. CONSTITUTIONAL: Awake but drowsy  HEENT: Head is normocephalic, atraumatic. EOMI, PERRLA  NECK: Soft, trachea midline and straight  LUNGS: Chest expands equally bilaterally upon respiration, no accessory muscle used  CARDIOVASCULAR: atrial fibrillation, tachycardic rate  ABDOMEN: soft, nontender, nondistended  NEUROLOGIC: CN II-XII are grossly intact.  There are no focalizing motor or sensory deficits  EXTREMITIES: palpable bilateral femoral arteries, palpable R DP, no signals or pulses to L foot    LABS:     Recent Labs     03/10/21  1313 03/10/21  1744 03/11/21  0137 03/11/21  0431 03/11/21  1722   WBC 28.5* 25.1*  --   --   --    HGB 11.7* 11.9*  --   --  10.9   HCT 37.9* 39.3*  --   --  33.7    191  --   --   --      --   --   --  138   K 5.5*  --   --   --  3.9     --   --   --   --    CO2 19*  --   --   --   --    BUN 23*  --   --   --   --    CREATININE 1.2  --   --   --   --    CALCIUM 8.7  --   --   --   --    INR 2.51*  --   --  2.23*  --    AST 18  --   --   --   --    ALT 8*  --   --   --   --    BILITOT 1.0  --   --   --   --    NITRU  --   --  NEGATIVE  --   --    COLORU  --   --  YELLOW  --   --    BACTERIA  -- --  MODERATE  --   --      Recent Labs     03/10/21  1313   ALKPHOS 67   ALT 8*   AST 18   BILITOT 1.0   LABALBU 2.8*       RADIOLOGY:     Narrative   PROCEDURE: CTA ABDOMINAL AORTA W BILAT RUNOFF W WO CONTRAST       CLINICAL INFORMATION: LLE cold       COMPARISON: None       TECHNIQUE: 1.5 mm axial images were obtained through the chest after the administration of IV contrast.  A non-contrast localizer was obtained.  3D reconstructions were performed on the scanner to include oblique coronal MIP images through the abdomen,    pelvis, lower extremities, and the lung bases. .       All CT scans at this facility use dose modulation, iterative reconstruction, and/or weight-based dosing when appropriate to reduce radiation dose to as low as reasonably achievable.       FINDINGS:       Limited evaluation of the lung bases demonstrates mild dependent right basilar airspace disease which may represent atelectasis or pneumonia. There are also mild patchy groundglass opacities demonstrated at the visualized inferior lateral left upper lobe    which may also represent an inflammatory or infectious process.        The liver, gallbladder, pancreas and adrenal glands appear normal. There is heterogeneous enhancement of the spleen, likely related to phase of contrast enhancement.       There is nonspecific fluid demonstrated along the inferior pole of the right kidney. This may in part be related to motion. However, cannot exclude an exophytic cystic lesion. This is not well characterized on the current examination. No hydronephrosis    or hydroureter is seen.       There is no evidence of bowel obstruction. The appendix appears normal. There is no free air. There is a moderate amount retained stool within the rectum.       No acute osseous findings are seen.       Calcified plaques are demonstrated at the right lung base which is nonspecific. No acute osseous findings are seen.  However there is multilevel lumbar degenerative disc disease and lower lumbar facet arthrosis.       Patient is status post prior aortobiiliac endograft stenting. 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 The left external iliac artery is also occluded. However there is reconstitution of flow at the left common femoral artery due to pelvic collaterals.       There is a partially calcified mass within the rightward inferior urinary bladder on axial image 155. This is concerning for a bladder neoplasm. Recommend clinical correlation.       The left superficial femoral artery demonstrates multifocal areas of high-grade stenosis which are significantly flow-limiting. In addition, the popliteal artery is very diminutive in caliber with multifocal areas of high-grade stenosis. There is    opacification of the tibial peroneal trunk. However there appears to be occlusion of the tibial peroneal trunk proximally and the anterior tibial artery appears occluded proximally as well.       The right iliac limb appears patent. The right external iliac artery and common femoral artery appear patent.       The right superficial femoral artery appears very diminutive in caliber demonstrating multifocal areas of high-grade stenosis. The distal right SFA appears occluded. The proximal right popliteal artery also appears occluded. However there is    reconstitution of flow by collateral vessels at its above-the-knee segment. The remaining popliteal artery is very diminutive in caliber. There is three-vessel runoff initially on the right. However there is occlusion of the right anterior tibial artery    distally. The dorsalis pedis artery is very small in caliber as it crosses the right ankle distally.       No lymphadenopathy is seen. There is no free air. There is no free fluid.       There are small fluid collections demonstrated adjacent to the common femoral arteries bilaterally. These may represent postoperative seromas.           Impression   1. Patient is status post prior aortobiiliac endograft stenting. 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 The left external iliac artery is also occluded. However there is reconstitution of flow at the left common femoral artery due to pelvic    collaterals.       2. There is a partially calcified mass within the rightward inferior urinary bladder on axial image 155. This is concerning for a bladder neoplasm. Recommend clinical correlation.       3. The left superficial femoral artery demonstrates multifocal areas of high-grade stenosis which are significantly flow-limiting. In addition, the left popliteal artery is very diminutive in caliber with multifocal areas of high-grade stenosis. There is    opacification of the tibial peroneal trunk. However there appears to be occlusion proximally and the anterior tibial artery appears occluded proximally. No runoff vessels are seen crossing the left ankle distally.       4. The right superficial femoral artery appears very diminutive in caliber demonstrating multifocal areas of high-grade stenosis. The distal right SFA appears occluded.        5. The proximal right popliteal artery also appears occluded. However there is reconstitution of flow by collateral vessels at its above-the-knee segment. The remaining popliteal artery is very diminutive in caliber.        6. There is three-vessel runoff initially on the right. However there is occlusion of the right anterior tibial artery distally. The dorsalis pedis artery is very small in caliber as it crosses the right ankle distally.       7. Limited evaluation of the lung bases demonstrates mild dependent right basilar airspace disease which may represent atelectasis or pneumonia. There are also mild patchy groundglass opacities demonstrated at the visualized inferior lateral left upper    lobe which may also represent an inflammatory or infectious process.       8.  There are small fluid collections demonstrated adjacent to the common femoral arteries bilaterally. These may represent postoperative seromas.           **This report has been created using voice recognition software.  It may contain minor errors which are inherent in voice recognition technology. **       Final report electronically signed by Dr. Dannie Cochran on 3/10/2021 3:26 PM          Thank you for the interesting evaluation. Further recommendations to follow. Jordan Troy  3/11/2021, 8:05 PM        Attending Note      I have reviewed the above [facility]CS note(s) and I either performed the key elements of the medical history and physical exam or was present with the critical care resident when the key elements of the medical history and physical exam were performed. I have discussed the findings, established the care plan and recommendations with the critical care team.  Obtain post op labs. Consult ID as well as cardiology. Follow serial labs. Uncertain prognosis in this 81 yo male with multiple co-morbidities.     Gibran Muniz MD  3/11/2021  8:51 PM

## 2021-03-12 NOTE — CARE COORDINATION
Case Management Initial Discharge Plan  Warden Kuo,             Met with:spouse/SO on the phone to discuss discharge plans. Information verified: address, contacts, phone number, , insurance Yes    Emergency Contact/Next of Kin name & number: Selene Helm, Wife, [phone]    PCP: No primary care provider on file. Date of last visit: unknown    Insurance Provider: Medicare    Discharge Planning    Living Arrangements:      Support Systems:       Home has 2 stories  2 stairs to climb to get into front door, 1 flight of stairs to climb to reach second floor  Location of bedroom/bathroom in home main    Patient able to perform ADL's:Assisted    Current Services (outpatient & in home) DME  DME equipment: walker and cane, oxygen  DME provider: unknown    Receiving oral anticoagulation therapy? No    If indicated:   Physician managing anticoagulation treatment: n/a  Where does patient obtain lab work for ATC treatment? n/a      Potential Assistance Needed:       Patient agreeable to home care: No  [place] of choice provided:  n/a    Prior SNF/Rehab Placement and Facility: [place] [id number] to SNF/Rehab: Yes  [place] of choice provided: yes     Evaluation: n/a    Expected Discharge date:       Patient expects to be discharged to: Follow Up Appointment: Best Day/ Time:      Transportation provider: rhonda  Transportation arrangements needed for discharge: Yes    Readmission Risk              Risk of Unplanned Readmission:        16             Does patient have a readmission risk score greater than 14?: Yes  If yes, follow-up appointment must be made within 7 days of discharge. Goals of Care:       Discharge Plan: from [facility], referral sent, bed NOT on hold there    1550- spoke with [facility] home, they are able to accept him when he is ready for discharge as long as they have a bed available.  Anthony Tompkins stated that they will need a covid test within 24 hours of discharge. Contact number over the weekend is [facility] at [phone].           Electronically signed by Cain Paez RN on 3/12/21 at 3:44 PM EST

## 2021-03-12 NOTE — PROGRESS NOTES
92%   BMI 18.53 kg/m²     General appearance: awake, alert, in no apparent respiratory distress   HEENT: Head: Normocephalic, no lesions, without obvious abnormality  Neck: no JVD  Lungs: clear to auscultation bilaterally, no basilar rales, no wheezing   Heart: regular rate and rhythm, S1, S2 normal, no murmur, click, rub or gallop  Abdomen: soft, non-tender; bowel sounds normal  Extremities: No LE edema  Neurologic: Mental status: Alert, oriented. Motor and sensory not done. EKG:       Echocardiogram:      Coronary Angiography:         Assessment / Acute Cardiac Problems:       IMPRESSION:    1. Permanent A fib   2. Acute limb ischemia s/p open thrombectomy of left iliac limb and aortic endograft & open thrombectomy of left external iliac and femoral arteries   3. AAA s/p EVAR  4. HLD  5. HTN     RECOMMENDATIONS:  1. Currently on levophed @ 8. Wean as tolerated  2. Hold home Toprol XL dose while requiring pressors  3. Given 1 dose of digoxin 250 IV currently rate controlled. 4. On coumadin. INR 1.8  5. K>4, Mg>2  6. Rest per primary team.     Thank you for allowing us to participate in the care of Ramona Wynne. If you have any questions or concerns, please do not hesitate to contact us.     Discussed with patient and Nurse. Discussed with patient and nursing. Pacheco Mae MD  Fellow, Cardiovascular Diseases   [facility]   Pager - [phone]    I performed a history and physical examination of the patient and discussed management with the resident. I reviewed the residents note and agree with the documented findings and plan of care. Any areas of disagreement are noted on the chart. I was personally present for the key portions of any procedures. I have documented in the chart those procedures where I was not present during the key portions.  I have personally evaluated this patient and have completed at least one if not all key elements of the E/M (history, physical exam, and MDM). Additional findings are as noted. Wean pressors. Cr normal. Add digoxin 0.125mg po qday.   Vianey Boucher MD

## 2021-03-12 NOTE — PROGRESS NOTES
Presented to patients room to exam patient, patient had been hypotensive and in a-fib with RVR. Patients RVR was being treated with Cardizem per cardiology. His hypotension was worsening. Began to examine patient and was told by patient that he wished to be \"left alone. \" The decision was made to start the patient on levophed to help support his blood pressure. Had a discussion with the patient that we would need to place a central venous line due to the need for pressor support. The patient refused placement of central line again stating he did not wish to have anything done to him tonight. Discussed with patient possible complications of running levophed peripherally he stated that he \"does not care and wishes to be left alone. \" Multiple residents attempted to talk to him and he would not change his mind. He was alert and oriented x4. Patient went on to state that he did not even want his code status changed prior to his procedure today.     Julio Ronquillo DO PGY 2  General Surgery Resident  03/11/21 11:51 PM

## 2021-03-12 NOTE — PROGRESS NOTES
ICU PROGRESS NOTE        PATIENT NAME: [id number] [facility] RECORD NO. [id number]  DATE: 3/12/2021    PRIMARY CARE PHYSICIAN: No primary care provider on file. HD: # 1    ASSESSMENT    Patient Active Problem List   Diagnosis    [facility]VD [facility]peripheral [facility] disease) [facility]    Severe malnutrition ([facility] [facility]    Ischemic leg    Iliac artery occlusion, left (HCC)    Status post abdominal aortic aneurysm (AAA) repair       MEDICAL DECISION MAKING AND PLAN  1. Neuro:  1. Pain/ sedation - pain well controlled, fentanyl 50mcg Q2h prn  2. GCS 15, A&Ox4  3. Intact sensation b/l upper and lower extremities    2. CV  1. AFIB w/ RVR - HR - 137-91, currently in persistent afib, RVR resolved after digoxin 250mg x1  1. Cardizem gtt d/c due to hypotension  2. Cardiology consulted, continue therapeutic heparin, bridge to warfarin when appropriate per vascular  2. Hypotension - improving  1. Levophed currently at 8mcg - MAP labile at   2. Plan to wean levo as able, currently running peripherally, will need CVC if continued  3. Start midodrine 10mg TID for pressure support      4. POD#1 s/p left femoral artery cutdown and open thrombectomy of left iliac artery endograft limb, placement of left iliac limb extension   1. Continue heparin gtt   2. Palpable fem, good doppler signal in left DP   3. Plan for warfarin bridging in conjunction with vascular surgery    3. Pulm  1. O2 sat 96-98% on RA  2. CXR - parenchymal scarring, bibasilar atelectasis  3. COVID positive - okay to be out of isolation per ID    4. GI/Nutrition  1. General diet  2. Continue pepcid 20mg IV BID    5. Renal/lytes  1. I/O: 800/650 over past 24hours  2. UOP: 0.71 cc/kg/hr  3. IVF: BB@ 50cc/hr, increase to 100cc  4. M.9, repleted with 1g IV mag sulfate  5. Phos: 1.9, repleted  6. Hematuria - resolved, possible traumatic ivory. Patient with bladder mass on scan, urology consulted, plan for outpatient cysto    6. Heme  1.  DVT prophylaxis- on therapeutic heparin gtt  2. Heparin gtt - PTT 48.1, trend PTT Q6  7. Endocrine        1. Glucose <200, no insulin required    7. Musculoskeletal  1. CK/Kirt downtrending  2. PT/OT    8. Micro  1. Tmax - 37.2  2. WBC downtrending from 28.5 to 11.8  3. Rocephin for suspected UTI, culture pending  4. COVID +, clear from isolation per ID    9. Family/dispo  1. DNR-CCA, possible status change to DNR-CC    10. Lines  1. Bilateral PIV  2. L radial art line  3. [place]     CHECKLIST    CAM-ICU RASS: 0  RESTRAINTS: Not indicated  IVF: LR @100cc/hr  NUTRITION: General diet  ANTIBIOTICS: Rocephin  GI: Pepcid BID  DVT: Heparin  GLYCEMIC CONTROL: Not indicated  HOB >45: yes  MOBILITY: PT/OT    Chief Complaint: \"Leg numbness\"    Rupesh Hunter is an 79yo M transferred from [address] [place] [address] for occlusion of left femoral artery, aortic endograft and left iliac limb. Patient is POD#1 s/p thrombectomy per vascular surgery. Patient with known history of Afib, had RVR post-operatively. Initially started on cardizem gtt but became hypotensive. Cardizem d/c and given digoxin 250mg IV per cardiology. Rate now well controlled, but persistent afib. Urology consulted for bladder mass. Patient without any complaints this morning. Will discuss code status with family as patient was [facility] but changed status for surgery. Palliative consulted and currently DNR-CCA.       OBJECTIVE  VITALS: Temp: Temp: 98.2 °F (36.8 °C)Temp  Av.2 °F (37.3 °C)  Min: 96.4 °F (35.8 °C)  Max: 99.7 °F (13.2 °C) BP Systolic (04RBP), YTF:405 , Min:73 , TAT:016   Diastolic (64XOF), BEQ:84, Min:38, Max:137   Pulse Pulse  Av.8  Min: 73  Max: 104 Resp Resp  Av.2  Min: 0  Max: 24 Pulse ox SpO2  Av.6 %  Min: 80 %  Max: 100 %    Physical Exam  Constitutional:    A&Ox3, no apparent distress, frail appearing elderly male    HENT:      Head: Atraumatic, normocephalic     Neck: JVD present   Eyes:      Pupils: PERRL     EOMI  Cardiovascular:      Rate and Rhythm: Irregular rhythm, normal rate     Pulses: Palpable bilateral fems. Left DP with dopplerable signal.  Pulmonary:      Effort: Normal, Equal chest rise b/l     Breath sounds: CTA b/l  Abdominal:      General: Soft, Non-tender, non-distended     Palpations: Non-tender, no organomegaly  Skin:     General: Clean, dry, no rash or erythema noted      Drain/tube output:      LAB:  CBC:   Recent Labs     03/10/21  1313 03/10/21  1744 03/11/21  1722 03/11/21  2215   WBC 28.5* 25.1*  --  11.8*   HGB 11.7* 11.9* 10.9 10.1*   HCT 37.9* 39.3* 33.7 31.4*   MCV 94.8* 96.3*  --  90.8    191  --  182     BMP:   Recent Labs     03/10/21  1313 03/11/21  1722 03/11/21  2215 03/12/21  0616    138 134* 137   K 5.5* 3.9 4.3 4.4     --  105 104   CO2 19*  --  23 23   BUN 23*  --  27* 25*   CREATININE 1.2  --  0.93 1.00   GLUCOSE 133*  --  117* 148*         RADIOLOGY:  CXR:   Xr Chest Portable    Result Date: 3/11/2021  EXAMINATION: ONE XRAY VIEW OF THE CHEST 3/11/2021 9:00 pm COMPARISON: None. HISTORY: ORDERING SYSTEM PROVIDED HISTORY: postop; BNP [id number] TECHNOLOGIST PROVIDED HISTORY: postop; BNP [id number] Reason for Exam: portable upright/ post op [id number] Acuity: Acute Type of Exam: Ongoing FINDINGS: Cardiomediastinal silhouette is normal in size. There is mild elevation of the right hemidiaphragm. There are linear bibasilar pulmonary opacities. There is right apical pleural and parenchymal scarring. No large pleural effusion or pneumothorax. No acute osseous abnormality. Right lateral pleural thickening with adjacent linear pulmonary opacity. No acute osseous abnormality.          Bebo Padilla DO  3/12/21, 8:05 AM

## 2021-03-12 NOTE — PROGRESS NOTES
Occupational Therapy Not Seen Note    DATE: 3/12/2021  Name: Wisam Lugo  : 1934  MRN: [id number]    Patient not available for Occupational Therapy due to:    Patient is not appropriate for OOB activity at this time d/t low BP requiring Levo    Next Scheduled Treatment: Ck 3/13     Electronically signed by Laura Allen OT on 3/12/2021 at 11:23 AM

## 2021-03-12 NOTE — CONSULTS
[facility]    Inpatient Consultation Note               Today's Date: 3/12/2021  Patient Name: Axel Howard  Date of admission: 3/11/2021  3:14 PM  Patient's age: 80 y.o., 1934  Admission Dx: LEFT LEG ISCHEMIA    Reason for  Consult:  A fib with RVR    Requesting Physician: Sultana Varela MD    CHIEF COMPLAINT:     No chief complaint on file. History Obtained From:  patient, electronic medical record    HISTORY OF PRESENT ILLNESS:      The patient is a 80 y.o. male who is admitted to the hospital for acute limb ischemia. PMH of permanent A fib on coumadin, AAA s/p recent EVAR for 5.6 cm AAA with bilateral femoral exposure presented to an [facility] facility reporting a cold left lower extremity associated with numbness and pins and needles feeling. [facility] to have a occluded left iliac limb with poor runoff to remainder of LLE. Patient was started on heparin gtt and transferred to [facility]. V's for vascular intervention. Was taken to OR for occlusion of left iliac endograft limb, with severe stenosis of iliac iliac artery. Post op was noted to be in a fib with RVR. Of note, patient has a history of permanent a fib. Initially was started on a diltiazem gtt without bolus which resulted in his blood pressure dropping. Diltiazem was discontinued and has subsequently been rate controlled with 1 IV dose of digoxin 250. Currently on heparin gtt and levophed @ 8. Past Medical History:   has a past medical history of Atrial fibrillation ([facility] [address] Cancer ([facility] [address] and Pneumonia. Past Surgical History:   has a past surgical history that includes Skin cancer excision and Prostate surgery. Home Medications:    Prior to Admission medications    Medication Sig Start Date End Date Taking?  Authorizing Provider   Magnesium Oxide (MAG- PO) Take 400 mg by mouth daily  2/26/21   Historical Provider, MD   metoprolol succinate (TOPROL XL) 50 MG extended mg/hr, Intravenous, Continuous  norepinephrine (LEVOPHED) 16 mg in sodium chloride 0.9 % 250 mL infusion, 2-100 mcg/min, Intravenous, Continuous  norepinephrine-sodium chloride (LEVOPHED) 16-0.9 MG/250ML-% infusion, , ,   magnesium sulfate 1000 mg in dextrose 5% 100 mL IVPB, 1,000 mg, Intravenous, PRN  potassium chloride (KLOR-CON M) extended release tablet 40 mEq, 40 mEq, Oral, PRN **OR** potassium bicarb-citric acid (EFFER-K) effervescent tablet 40 mEq, 40 mEq, Oral, PRN **OR** potassium chloride 10 mEq/100 mL IVPB (Peripheral Line), 10 mEq, Intravenous, PRN    Allergies:  Patient has no known allergies. Social History:   reports that he has quit smoking. His smoking use included cigarettes. He has quit using smokeless tobacco. He reports current alcohol use. He reports that he does not use drugs. Family History: family history includes Diabetes in his mother; Heart Attack in his father; Heart Disease in his father. REVIEW OF SYSTEMS:      · Constitutional: there has been no unanticipated weight loss. · Eyes: No visual changes or diplopia. · ENT: No Headaches  · Cardiovascular:  Remaining as above  · Respiratory: No cough  · Gastrointestinal: No abdominal pain. No change in bowel or bladder habits. · Genitourinary: No dysuria, trouble voiding, or hematuria. · Musculoskeletal: No joint complaints. · Neurological: No headache  · Hematologic/Lymphatic: No abnormal bruising or bleeding      PHYSICAL EXAM:      BP (!) 124/52   Pulse 91   Temp 98.2 °F (36.8 °C) (Oral)   Resp 18   Ht 6' 2.02\" (1.88 m)   Wt 144 lb 6.4 oz (65.5 kg)   SpO2 98%   BMI 18.53 kg/m²      Intake/Output Summary (Last 24 hours) at 3/12/2021 0650  Last data filed at 3/12/2021 0600  Gross per 24 hour   Intake 800 ml   Output 500 ml   Net 300 ml         Constitutional and General Appearance:    Alert, cooperative, no distress and appears stated age  Respiratory:  · No for increased work of breathing.    · On auscultation: clear to auscultation bilaterally  Cardiovascular:  · Regular S1 and S2.   · No murmurs  Abdomen:   · No masses or tenderness  · Bowel sounds present  Extremities:  ·  No Cyanosis or Clubbing  ·  Lower extremity edema: No  Neurological:  · Alert and oriented. · Moves all extremities well    DATA:    Diagnostics:    EKG:   A fib w/ RVR      Labs:     CBC:   Recent Labs     03/10/21  1744 03/11/21  1722 03/11/21  2215   WBC 25.1*  --  11.8*   HGB 11.9* 10.9 10.1*   HCT 39.3* 33.7 31.4*     --  182     BMP:   Recent Labs     03/10/21  1313 03/11/21  1722 03/11/21  2215    138 134*   K 5.5* 3.9 4.3   CO2 19*  --  23   BUN 23*  --  27*   CREATININE 1.2  --  0.93   LABGLOM 57*  --  >60   GLUCOSE 133*  --  117*     Pro-BNP:    Recent Labs     03/10/21  1313   PROBNP 02401.0*     BNP: No results for input(s): BNP in the last 72 hours. PT/INR:   Recent Labs     03/10/21  1313 03/11/21  0431   INR 2.51* 2.23*     APTT:  Recent Labs     03/11/21  2215 03/12/21  0617   APTT 84.6* 48.1*     CARDIAC ENZYMES:  Recent Labs     03/11/21  2215 03/12/21  0352   CKTOTAL 1,250* 1,065*     Recent Labs     03/10/21  1313   TROPONINT < 0.010       FASTING LIPID PANEL:No results found for: HDL, LDLDIRECT, LDLCALC, TRIG  LIVER PROFILE:  Recent Labs     03/10/21  1313   AST 18   ALT 8*   LABALBU 2.8*         Patient's Active Problem List  Active Problems:    Ischemic leg    Iliac artery occlusion, left (HCC)    Status post abdominal aortic aneurysm (AAA) repair  Resolved Problems:    * No resolved hospital problems. *        IMPRESSION:    1. Permanent A fib w/ RVR  2. Acute limb ischemia s/p open thrombectomy of left iliac limb and aortic endograft & open thrombectomy of left external iliac and femoral arteries   3. AAA s/p EVAR  4. HLD  5. HTN    RECOMMENDATIONS:  1. Currently on levophed @ 8. Wean as tolerated  2. Hold home Toprol XL dose while requiring pressors  3. Given 1 dose of digoxin 250 IV currently rate controlled. 4. Continue heparin gtt. Restart coumadin when appropriate with Vascular surgery and monitor INR. Goal INR 2-3  5. K>4, Mg>2  6. Rest per primary team.    Thank you for allowing us to participate in the care of Wisam Lugo. If you have any questions or concerns, please do not hesitate to contact us. Discussed with patient and Nurse. Rolo Shipman M.D. Fellow, [address] [place] [address]        I have reviewed the case / procedure with resident / fellow  I have examined the patient personally  Patient agree with treatment plan as discussed before, final arrangement based on my evaluation and exam.    Risk and benefit of procedure planned were explained in details. Procedure was performed by me personally, with all aspect of the procedure being done using standard protocol. Note was modified based on my own assessment and treatment. Lavinia Jordan MD  [facility]             Please note that part of this chart were generated using voice recognition  dictation software. Although every effort was made to ensure the accuracy of this automated transcription, some errors in transcription may have occurred.

## 2021-03-12 NOTE — ANESTHESIA POSTPROCEDURE EVALUATION
Department of Anesthesiology  Postprocedure Note    Patient: Mahad Delacruz  MRN: [id number]  YOB: 1934  Date of evaluation: 3/11/2021  Time:  9:20 PM     Procedure Summary     Date: 03/11/21 Room / Location: [address] [place] [address] / [facility]    Anesthesia Start: 1636 Anesthesia Stop: 1954    Procedure: LEFT LEG THROMBECTOMY LEFT LIMB STENT GRAFT (Left ) Diagnosis: (LEFT LEG ISCHEMIA)    Surgeons: Ana Luisa Williamson MD Responsible Provider: Taras Durham MD    Anesthesia Type: general ASA Status: 4 - Emergent          Anesthesia Type: general    Latia Phase I:      Latia Phase II:      Last vitals: Reviewed and per EMR flowsheets. POST-OP ANESTHESIA NOTE       /82   Pulse 103   Temp 98.2 °F (36.8 °C) (Oral)   Resp 18   Ht 6' 2.02\" (1.88 m)   Wt 144 lb 6.4 oz (65.5 kg)   SpO2 93%   BMI 18.53 kg/m²    Pain Assessment: 0-10              Anesthesia Post Evaluation    Patient location during evaluation: ICU  Patient participation: complete - patient participated  Level of consciousness: awake  Airway patency: patent  Nausea & Vomiting: no vomiting and no nausea  Complications: no  Cardiovascular status: hemodynamically stable  Respiratory status: acceptable    No apparent anesthesia-related problems.

## 2021-03-12 NOTE — CONSULTS
Palliative Care Inpatient Consult    NAME:  Demetrius Morelos  MEDICAL RECORD NUMBER:  [id number]  AGE: 80 y.o. GENDER: male  : 1934  TODAY'S DATE:  3/12/2021    Reasons for Consultation:    Symptom and/or pain management  Provision of information regarding PC and/or hospice philosophies  Complex, time-intensive communication and interdisciplinary psychosocial support  Clarification of goals of care and/or assistance with difficult decision-making  Guidance in regards to resources and transition(s)    Members of PC team contributing to this consultation are :  Wendell Gosselin CNP  Palliative Care   History of Present Illness     The patient is a 80 y.o. Non-/non  male who presents with Ischemic Leg     Referred to Palliative Care by   [x] Physician   [] Nursing  [] Family Request   [] Other:       He was admitted to the ICU service for LEFT LEG ISCHEMIA. His hospital course has been associated with Ischemic Leg . The patient has a complicated medical history and has been hospitalized since 3/11/2021  3:14 PM. Patient transferred to [facility] due to Left lower leg numbness, coldness, and no palpable pulses in Left lower extremity. Patient had recent AAA repair with Biliac stents. Patient with medical history of atrial fib, skin cancer, pneumonia, Covid, and PVD. Patient is currently on oxygen per [place]. Patient is drowsy and is alert to person and time. Patient states he was [place] in [facility]. Patient had surgery yesterday for thrombectomy of left leg. Patient is currently on a heparin drip post surgery and Levophed for hypotension. Patient labs today phosphorus 1.8, magnesium 1.9, ionized calcium 1.12, glucose 148, BUN 25, creatinine 1.00, calcium 8.4, sodium 137, potassium 4.4, chloride 104, hemoglobin 401. Patient has had the following scans during admission CT head, CTA abdomen, and chest x-ray.   Patient has the following consults palliative care for goals of care and family support, urology due to bladder mass and hematuria, vascular due to ischemic leg, infectious disease right due to Covid, and cardiology  due to atrial fib. CT head  No acute process. Chronic changes detailed above           CTA abdomen  1. Patient is status post prior aortobiiliac endograft stenting. 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 The left external iliac artery is also occluded. However there is reconstitution of flow at the left common femoral artery due to pelvic    collaterals.       2. There is a partially calcified mass within the rightward inferior urinary bladder on axial image 155. This is concerning for a bladder neoplasm. Recommend clinical correlation.       3. The left superficial femoral artery demonstrates multifocal areas of high-grade stenosis which are significantly flow-limiting. In addition, the left popliteal artery is very diminutive in caliber with multifocal areas of high-grade stenosis. There is    opacification of the tibial peroneal trunk. However there appears to be occlusion proximally and the anterior tibial artery appears occluded proximally. No runoff vessels are seen crossing the left ankle distally.       4. The right superficial femoral artery appears very diminutive in caliber demonstrating multifocal areas of high-grade stenosis. The distal right SFA appears occluded.        5. The proximal right popliteal artery also appears occluded. However there is reconstitution of flow by collateral vessels at its above-the-knee segment. The remaining popliteal artery is very diminutive in caliber.        6. There is three-vessel runoff initially on the right. However there is occlusion of the right anterior tibial artery distally. The dorsalis pedis artery is very small in caliber as it crosses the right ankle distally.       7. Limited evaluation of the lung bases demonstrates mild dependent right basilar airspace disease which may represent atelectasis or pneumonia.  There are also mild patchy groundglass opacities demonstrated at the visualized inferior lateral left upper    lobe which may also represent an inflammatory or infectious process.       8. There are small fluid collections demonstrated adjacent to the common femoral arteries bilaterally. These may represent postoperative seromas.               Chest Xray  1. Linear bibasilar opacities, likely atelectasis.    2. Right apical and lateral pleural and parenchymal scarring.             Active Hospital Problems    Diagnosis Date Noted    Ischemic leg [I99.8] 03/11/2021    Iliac artery occlusion, left (HCC) [I74.5]     Status post abdominal aortic aneurysm (AAA) repair [C28.026, Z86.79]        PAST MEDICAL HISTORY      Diagnosis Date    Atrial fibrillation (HCC)     Cancer (HCC)     skin (neck) removed    Pneumonia        PAST SURGICAL HISTORY  Past Surgical History:   Procedure Laterality Date    PROSTATE SURGERY      SKIN CANCER EXCISION         SOCIAL HISTORY  Social History     Tobacco Use    Smoking status: Former Smoker     Types: Cigarettes    Smokeless tobacco: Former User   Substance Use Topics    Alcohol use: Yes     Comment: rarely    Drug use: Never       ALLERGIES  No Known Allergies      MEDICATIONS  Current Medications    sodium phosphate IVPB  20 mmol Intravenous Once    sodium chloride flush  10 mL Intravenous 2 times per day    famotidine (PEPCID) injection  20 mg Intravenous BID    acetaminophen  1,000 mg Oral 3 times per day    cefTRIAXone (ROCEPHIN) IV  1,000 mg Intravenous Q24H    metoprolol tartrate  25 mg Oral BID    norepinephrine-sodium chloride         sodium chloride flush, ondansetron, heparin (porcine), heparin (porcine), ipratropium-albuterol, metoprolol, oxyCODONE, fentanNYL, magnesium sulfate, potassium chloride **OR** potassium alternative oral replacement **OR** potassium chloride  IV Drips/Infusions   lactated ringers 50 mL/hr at 03/12/21 0451    heparin (PORCINE) Infusion 18 hearing loss, congestion, or difficulty swallowing   Respiratory: no wheezing, chest tightness, or shortness of breath. Cardiovascular: no chest pain or pressure, no palpitations, no diaphoresis Patient on Levophed for hypotension   Gastrointestinal: no nausea, vomiting,abdominal pain, diarrhea or constipation, no melena   Genitourinary: Patient with ivory catheter Musculoskeletal: Generalized weakness and no edema BLE  Skin: no rashes or sores   Neurological: no focal weakness, numbness, tingling, or headache, no seizures    PHYSICAL ASSESSMENT:  Constitutional: Alert and oriented to person and time  Head: Normocephalic and atraumatic. Eyes: Patient eyes closed   Neck: Normal range of motion. Neck supple. No tracheal deviation present. Cardiovascular: Normal rate and regular rhythm, S1, S2, no murmur. Patient levophed for hypotension   Pulmonary/Chest: Lungs diminished respirations relaxed   Abdomen: Soft. No tenderness, not distended, no ascites, no organomegaly   Musculoskeletal: generalized weakness and no edema   Neurological: patient drowsy and forgetful   Skin: Normal turgor, no bleeding, no bruising     Palliative Performance Scale:  ___60%  Ambulation reduced; Significant disease; Can't do hobbies/housework; intake normal or reduced; occasional assist; LOC full/confusion  ___50%  Mainly sit/lie; Extensive disease; Can't do any work; Considerable assist; intake normal or reduced; LOC full/confusion  _x__40%  Mainly in bed; Extensive disease; Mainly assist; intake normal or reduced; LOC full/confusion   ___30%  Bed Bound; Extensive disease; Total care; intake reduced; LOCfull/confusion  ___20%  Bed Bound; Extensive disease; Total care; intake minimal; Drowsy/coma  ___10%  Bed Bound; Extensive disease; Total care; Mouth care only; Drowsy/coma  ___0       Death      Plan      Palliative Interaction:  Called and spoke to patient wife Arcelia Maynard and introduced myself and my palliative care role.      We discussed patient diagnosis, medical history, and prognosis. I provided wife an opportunity to discuss her concerns, fears, and questions and provide emotional support to her. Patient is  to Isaak Grover and they have 3 biological children 1 son Gaby Diaz and 2 daughters Ashlyn Vasques, and Carrillo Hankins. Patient has [facility] paperwork and Alvino Daniels is POA #1 and Gaby Diaz is POA#2. I discussed code status levels with patient wife Isaak Grover and explained each level completely with wife. Patient wife states patient does not want CPR or intubation. Patient code status changed to [facility] no intubation. I also went and spoke with patient and he said he agreed with his wife. This conversation with wife and  witnessed with patient nurse Yary Alexandre. Paperwork completed and order placed in chart. Education/support to family  Education/support to patient  Discharge planning/helping to coordinate care  Communications with primary service  Pharmacologic pain management  Providing support for coping/adaptation/distress of family  Providing support for coping/adaptation/distress of patient  Caregiver support/education  Code status clarified: Full Code  Code status clarified: [facility]  Code status clarified: DNRCCA  Principle Problem/Diagnosis:  Ischemic Left Leg   Additional Assessments:   Active Problems:    Ischemic leg    Iliac artery occlusion, left (HCC)    Status post abdominal aortic aneurysm (AAA) repair  Resolved Problems:    * No resolved hospital problems. *    1- Symptom management/ pain control     Pain Assessment:  Pain is controlled with current analgesics. Medication(s) being used: Roxicodone .                Anxiety:  none                          Dyspnea:  Oxygen per NC                           Fatigue:  generalized weakness     Other:    2- Goals of care evaluation   The patient goals of care are improve or maintain function/quality of life   Goals of care discussed with:    [] Patient independently    [] Patient and Family    [x] Family or Healthcare DPOA independently    [] Unable to discuss with patient, family/DPOA not present    3- Code Status  DNR-CCA    4- Other recommendations   - We will continue to provide comfort and support to the patient and the family    Palliative Care will continue to follow Mr. Shey Haywood care as needed. Thank you for allowing Palliative Care to participate in the care of Mr. Alicia Teran . This note has been dictated by dragon, typing errors may be a possibility. The total time I spent in seeing the patient, discussing goals of care, advanced directives, code status and other major issues was more than 60 minutes      Electronically signed by   FELIX Farmer NP  Palliative Care Team  on 3/12/2021 at 10:41 AM    Please call with any palliative questions or concerns. Palliative Care Team is available via perfect serve or via phone.

## 2021-03-12 NOTE — CONSULTS
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  · Emergent thrombectomy 3/11  · Recent AAA with EVAR  · Leukocytosis likely reactive to ischemia - improved  · Possible UTI  · Covid positive since at least 2/22 - asymptomatic    Other:  · Atrial fibrillation  · Hard to hear  Discussion / summary of stay / plan of care   ·   Recommendations   · No need for covid isolation  · 3/11 Ceftriaxone 1 g a day, pending urine culture  · I suspect most of the leukocytosis was actually from ischemia of the left leg  · Disc w RN and pt, [facility] resident    Infection Control Recommendations   · Marcellus Precautions  · Contact Isolation   · Airborne isolation  · Droplet Isolation    Antimicrobial Stewardship Recommendations   · Simplification of therapy  · Targeted therapy    Coordination ofOutpatient Care:   · Estimated Length of IV antimicrobials:  · Patient will need Midline / picc Catheter Insertion:   · Patient will need SNF:  · Patient will need outpatient wound care:     History of Present Illness:   Initial history:  Lew Neri is a 80y.o.-year-old male transferred from [facility] because of ischemic left leg he has had a recent EVAR for a 5.6 cm AAA with bilateral femoral exposure at [facility]. .  CT scan shows occlusion of the left endograft with a chronic superficial femoral artery occlusion significant stenosis at the distal end of the left iliac art. Upon admission his white count was 25, urine analysis showed some blood and concern for infection, urine culture recently done continues to be pending, urine culture was requested at [facility]    Patient was taken to surgery emergently by vascular.   Had open thrombectomy left iliac artery, aortic endograft, left external iliac and femoral artery 3/11. Chest x-ray shows atelectasis  Covid tested positive - but has no resp symptoms or smell or taste issues - hx + covid 2/22, and was isolated at the time - was an easy case. Infectious disease consult was placed for leukocytosis, urine infection, Covid positive    Repeat white count down to 11    Interval changes  3/12/2021   Patient Vitals for the past 8 hrs:   BP Pulse SpO2   03/12/21 0800 (!) 95/52 89 97 %   03/12/21 0730 (!) 120/46 87 97 %   03/12/21 0700 (!) 113/57 88 96 %   03/12/21 0500 (!) 124/52 91 98 %   03/12/21 0400 (!) 134/53 -- --   03/12/21 0300 (!) 125/44 -- --   03/12/21 0200 (!) 129/49 -- --       Summary of relevant labs:  Labs:  WBC 25-11  Micro:  Urine analysis with RBCs and concern for infection   Urine culture pending 3/11   covid + 3/11    imaging:  Chest x-ray atelectasis and scarring    I have personally reviewed the past medical history, past surgical history, medications, social history, and family history, and I haveupdated the database accordingly. Allergies:   Patient has no known allergies. Review of Systems:     Review of Systems   Constitutional: Negative for activity change. HENT: Negative for congestion. Eyes: Negative for discharge. Respiratory: Negative for chest tightness. Cardiovascular: Negative for palpitations. Gastrointestinal: Negative for abdominal distention. Endocrine: Negative for cold intolerance. Genitourinary: Positive for difficulty urinating. Negative for dysuria. Musculoskeletal: Negative for arthralgias. Skin: Positive for wound. Negative for color change. Allergic/Immunologic: Negative for environmental allergies and food allergies. Neurological: Negative for dizziness and facial asymmetry. Hematological: Negative for adenopathy. Psychiatric/Behavioral: Negative for agitation. Physical Examination :       Physical Exam  Constitutional:       Appearance: Normal appearance.    HENT: Head: Normocephalic and atraumatic. Mouth/Throat:      Mouth: Mucous membranes are moist.   Eyes:      General: No scleral icterus. Conjunctiva/sclera: Conjunctivae normal.   Neck:      Musculoskeletal: Neck supple. No neck rigidity. Cardiovascular:      Rate and Rhythm: Normal rate and regular rhythm. Pulses: Normal pulses. Heart sounds: Normal heart sounds. Pulmonary:      Effort: No respiratory distress. Breath sounds: Normal breath sounds. Abdominal:      General: There is no distension. Palpations: There is no mass. Genitourinary:     Comments: + ivory  Musculoskeletal:         General: No swelling or deformity. Comments: Left foot warm and discoloration of the toes improved - warming blanket   Skin:     Coloration: Skin is not jaundiced or pale. Neurological:      General: No focal deficit present. Mental Status: He is alert. Mental status is at baseline. Psychiatric:         Mood and Affect: Mood normal.         Thought Content:  Thought content normal.         Past Medical History:     Past Medical History:   Diagnosis Date    Atrial fibrillation ([facility] [address]     Cancer ([facility] [address]     skin (neck) removed    Pneumonia        Past Surgical  History:     Past Surgical History:   Procedure Laterality Date    PROSTATE SURGERY      SKIN CANCER EXCISION         Medications:      sodium phosphate IVPB  20 mmol Intravenous Once    magnesium sulfate  1,000 mg Intravenous Once    sodium chloride flush  10 mL Intravenous 2 times per day    famotidine (PEPCID) injection  20 mg Intravenous BID    acetaminophen  1,000 mg Oral 3 times per day    cefTRIAXone (ROCEPHIN) IV  1,000 mg Intravenous Q24H    metoprolol tartrate  25 mg Oral BID    norepinephrine-sodium chloride           Social History:     Social History     Socioeconomic History    Marital status:      Spouse name: Not on file    Number of children: Not on file    Years of education: Not on file   [employer] Highest education level: Not on file   Occupational History    Not on file   Social Needs    Financial resource strain: Not on file    Food insecurity     Worry: Not on file     Inability: Not on file    Transportation needs     Medical: Not on file     Non-medical: Not on file   Tobacco Use    Smoking status: Former Smoker     Types: Cigarettes    Smokeless tobacco: Former User   Substance and Sexual Activity    Alcohol use: Yes     Comment: rarely    Drug use: Never    Sexual activity: Not on file   Lifestyle    Physical activity     Days per week: Not on file     Minutes per session: Not on file    Stress: Not on file   Relationships    Social connections     Talks on phone: Not on file     Gets together: Not on file     Attends Confucianist service: Not on file     Active member of club or organization: Not on file     Attends meetings of clubs or organizations: Not on file     Relationship status: Not on file    Intimate partner violence     Fear of current or ex partner: Not on file     Emotionally abused: Not on file     Physically abused: Not on file     Forced sexual activity: Not on file   Other Topics Concern    Not on file   Social History Narrative    Not on file       Family History:     Family History   Problem Relation Age of Onset    Diabetes Mother     Heart Attack Father     Heart Disease Father     Cancer Neg Hx     Kidney Disease Neg Hx     Stroke Neg Hx       Medical Decision Making:   I have independently reviewed/ordered the following labs:    CBC with Differential:   Recent Labs     03/10/21  1313 03/10/21  1744 03/11/21  1722 03/11/21  2215   WBC 28.5* 25.1*  --  11.8*   HGB 11.7* 11.9* 10.9 10.1*   HCT 37.9* 39.3* 33.7 31.4*    191  --  182   SEGSPCT 86.9  --   --   --    LYMPHOPCT  --   --   --  7*   MONOPCT 8.4  --   --  10*     BMP:  Recent Labs     03/11/21  2215 03/12/21  0616   * 137   K 4.3 4.4    104   CO2 23 23   BUN 27* 25*   CREATININE 0.93 1.00   MG 1.5* 1.9     Hepatic Function Panel:   Recent Labs     03/10/21  1313   PROT 7.3   LABALBU 2.8*   BILITOT 1.0   ALKPHOS 67   ALT 8*   AST 18     No results for input(s): RPR in the last 72 hours. No results for input(s): HIV in the last 72 hours. No results for input(s): BC in the last 72 hours. Lab Results   Component Value Date    CREATININE 1.00 03/12/2021    GLUCOSE 148 03/12/2021       Detailed results: Thank you for allowing us to participate in the care of this patient. Please call with questions. This note is created with the assistance of a speech recognition program.  While intending to generate adocument that actually reflects the content of the visit, the document can still have some errors including those of syntax and sound a like substitutions which may escape proof reading. It such instances, actual meaningcan be extrapolated by contextual diversion.     Chary Dasilva MD  Office: [phone]  Perfect serve / office [phone]

## 2021-03-12 NOTE — PROGRESS NOTES
3/11  2120: Pt was in afib rvr HR 120s-160s, EKG done to confirm, sent perfectserve to [facility]. Per Dr. Austin Jean start cardizem gtt without bolus    Pt becoming hypotensive 70s/30s -120s  Dr. Adam Sigala, surgery resident at bedside. Levo ordered by Dr. Adam Sigala. Dr. Adam Sigala spoke to patient multiple times about the importance of placing a central line. Pt refused and stated that \"I just want to be left alone and sleep and we will discuss in the morning\"  Pt also refusing new peripheral IVs     2330 Dr Sandra Clayton and Dr Adam Sigala at bedside evaluating patient. Dr. Sandra Clayton spoke to pt re: central line. Pt then stated that he wants the police at bedside because \"I am about to get rid of 3 people\". Pt still A&Ox4. Pt still refusing central line and peripheral IVs. Pt then asked about code status change prior to his surgery. According to patient \"someone else changed code status from DNR to full code but it was not me\"    Per Dr. Sandra Clayton contact if HR is above 120    2355: Per Dr. Austin Jean switch Levo to Phenylephrine; Per Dr. Austin Jean keep HR btwn 100-110.    3/12  0009: sent Perfectserve to Dr. Adam Sigala re: Dr. Austin Jean recommendations and HR above 120    0013: Sent perfectserve to Dr. Sandra Clayton re: Dr. Austin Jean recommendations and HR of 136    0016: Per Dr. Sandra Clayton do not switch Levo to Phenylephrine    0038: Pt requesting to have Dr. Adam Sigala at bedside. Sent perfect serve to Dr. Adam Sigala re: pt request    8127: Pt now states that him and his wife agreed to change code status prior to having surgery. 0100: Dr. Adam Sigala at bedside. Pt will remain full code for 24hrs post op. Dr. Adam Sigala also spoke with Dr. Yara Grover with infectious disease, according to Dr Yara Grover pt needs to be in full CoVid isolation. 0118: Pt still refusing central line. Pt states that he is fully aware of the possible outcomes,(i.e., potentially causing necrosis to RUE)    0300: Pulsatility returned on LLE.  DP/TP+ for signals    0618: Cardiology resident at bedside assessing patient; per cardiology continue with rate control for afib, if pt goes into afib rvr again give a second dose of Digoxin at 250mcg. Continue to wean levophed.    0600: Dr. Allen Krishna at bedside confirms signals present on DP and TP. Dr. Allen Krishna also discussing need for central line with patient.  Pt agreed to central life if unable to wean off levo by afternoon

## 2021-03-12 NOTE — ED PROVIDER NOTES
past medical history of Atrial fibrillation ([facility] [address] Cancer ([facility] [address] and Pneumonia. SURGICAL HISTORY      has a past surgical history that includes Skin cancer excision and Prostate surgery. CURRENT MEDICATIONS       Discharge Medication List as of 3/11/2021  2:38 PM      CONTINUE these medications which have NOT CHANGED    Details   Magnesium Oxide (MAG- PO) Take 400 mg by mouth daily Historical Med      metoprolol succinate (TOPROL XL) 50 MG extended release tablet Take 50 mg by mouth dailyHistorical Med      potassium & sodium phosphates (PHOS-NAK) 280-160-250 MG PACK Take 1 packet by mouth 3 times dailyHistorical Med      ascorbic acid (VITAMIN C) 500 MG tablet Take 500 mg by mouth dailyHistorical Med      atorvastatin (LIPITOR) 20 MG tablet Take 20 mg by mouth dailyHistorical Med      mirtazapine (REMERON) 7.5 MG tablet Take 7.5 mg by mouth nightlyHistorical Med      warfarin (COUMADIN) 4 MG tablet Take 1 tablet by mouth daily Historical Med      Wound Dressings (HYDROGEL) GEL Apply 1 applicator topically daily as needed (for heel wound)Historical Med             ALLERGIES     has No Known Allergies. FAMILY HISTORY     He indicated that the status of his mother is unknown. He indicated that the status of his father is unknown. He indicated that the status of his neg hx is unknown.   family history includes Diabetes in his mother; Heart Attack in his father; Heart Disease in his father. SOCIAL HISTORY      reports that he has quit smoking. His smoking use included cigarettes. He has quit using smokeless tobacco. He reports current alcohol use. He reports that he does not use drugs. PHYSICAL EXAM     INITIAL VITALS:  height is 6' 2\" (1.88 m) and weight is 142 lb 1.6 oz (64.5 kg). His oral temperature is 97.5 °F (36.4 °C). His blood pressure is 99/49 (abnormal) and his pulse is 104. His respiration is 17 and oxygen saturation is 93%.     Physical Exam   Constitutional:  well-developed and SMEAR   ANION GAP   OSMOLALITY   APTT   APTT   APTT   APTT       EMERGENCY DEPARTMENT COURSE:   Vitals:    Vitals:    03/11/21 0053 03/11/21 0332 03/11/21 0845 03/11/21 1245   BP: (!) 100/41 (!) 108/56 (!) 94/49 (!) 99/49   Pulse: 72 74 73 104   Resp: 18 18 16 17   Temp: 97.7 °F (36.5 °C) 98 °F (36.7 °C) 96.4 °F (35.8 °C) 97.5 °F (36.4 °C)   TempSrc: Oral Oral Axillary Oral   SpO2: 97% 92% 93%    Weight:  142 lb 1.6 oz (64.5 kg)     Height:         Patient was a transfer from [place], initial report that was given to me by the attending at the facility was that patient's only complaint was cold left lower extremity. I had requested for the attending at the [facility] to start heparin at the time,stated that she could not do this because EMS was already there to pick patient up for transport. However, when patient arrived he had neuro deficits with hypotension and A. fib RVR. Stroke alert called secondary to left lower extremity weakness, CT head negative. INR 2.5. Patient not a TPA candidate according to neurologist.  Decision made to obtain a CTa abdomen and pelvis instead of CTa head and neck at this time. Patient signed out to Dr. Lulú Castro for final disposition. CRITICAL CARE:     There was a high probability of clinically significant/life threatening deterioration in this patient's condition which required my urgent intervention. Total critical care time was 45 minutes. This excludes any time for separately reportable procedures. CONSULTS:  None    PROCEDURES:  None    FINAL IMPRESSION      1. PVD (peripheral vascular disease) ([facility] [address]    2. Ischemia of left lower extremity    3. Atrial fibrillation with rapid ventricular response ([facility] [address]    4. Urinary tract infection in male          DISPOSITION/PLAN   Admitted    PATIENT REFERRED TO:  [place] [id number]  [phone]          DISCHARGE MEDICATIONS:  Discharge Medication List as of 3/11/2021  2:38 PM (Please note that portions of this note were completed with a voice recognition program.  Efforts were made to edit the dictations but occasionally words are mis-transcribed.)    DO Yudelka Crowell,   03/12/21 0000       Yudelka Jenkins DO  03/15/21 1309

## 2021-03-12 NOTE — PROGRESS NOTES
Pharmacy Note  Warfarin Consult    Enma Hubbard is a 80 y.o. male for whom pharmacy has been consulted to manage warfarin therapy. Consulting Physician: Douglas Gonzalez  Reason for Admission: LLE ischemia    Warfarin dose prior to admission: 6mg MF; 4mg TWTSS  Warfarin indication: Afib  Target INR range: 2-3     Past Medical History:   Diagnosis Date    Atrial fibrillation ([facility] [address]     Cancer ([facility] [address]     skin (neck) removed    Pneumonia                 Recent Labs     03/12/21  1144   INR 1.7     Recent Labs     03/10/21  1313 03/10/21  1744 03/11/21  1722 03/11/21  2215 03/12/21  1154   HGB 11.7* 11.9* 10.9 10.1* 9.6*   HCT 37.9* 39.3* 33.7 31.4* 29.8*    191  --  182  --        Current warfarin drug-drug interactions: Lovenox 1mg/kg      Date             INR        Dose   3/12/2021            1.7       6mg    Daily PT/INR while inpatient. PT/INR ordered to start . Thank you for the consult. Will continue to follow.       Baltazar Waggoner PharmD, BCPS 3/12/2021 6:33 PM

## 2021-03-12 NOTE — OP NOTE
Operative Note      Patient: Axel Howard  YOB: 1934  MRN: [id number]    Date of Procedure: 3/11/2021    Pre-Op Diagnosis: LEFT LEG ISCHEMIA    Post-Op Diagnosis: Occlusion of left iliac endograft limb, with severe stenosis of iliac iliac aretry       Procedure:  1) Redo exposure of left common femoral artery  2) Open thrombectomy of left iliac limb and aortic endograft  3) Open thrombectomy of left external iliac and femoral arteries  4) Aortogram, left lower extremity angiogram  5) Delayed placement of left iliac limb extension    Surgeon(s): Sultana Varela MD    Assistant: Dr. Manan Logan    Contrast: 30 ml    Anesthesia: General    Estimated Blood Loss (mL): 85 ml    Complications: None    Specimens: Thrombus    Implants: Medtronic Endurant Limb Extension 10 x 10 x 82 mm (left iliac artery)    Implant Name Type Inv. Item Serial No.  Lot No. LRB No. Used Action   GRAFT EVAR [id number] CATH 14FR NIT HI DENS  GRAFT EVAR L82MM [id number] CATH 14FR NIT HI DENS  [employer] VASC[employer]-WD  Left 1 Implanted       Drains: none    Findings: Occluded left iliac limb. After thrombectomy and placement of extension limb, there was restoration of pulsatile inflow. Good multiphasic doppler signals in common femoral artery. Unable to find doppler signals in the foot, patient had improved capillary refill but was clamped down. Angiogram reveals inline flow to left common femoral and profunda artery, the SFA is chronically occluded with reconstitution distal to Deric's canal and patency of popliteal artery. Plan: Continue heparin drip and bear hugger overnight. Detailed Description of Procedure:     Mr. Vivian Flores was brought to the operating room emergently for left leg ischemia.   He recently underwent at an outside facility, an endovascular repair for abdominal aortic aneurysm with bilateral common femoral open exposures, there is concern for stenosis and distal kinking of the left limb of the endograft which is occluded. Appropriate COVID precautions taken given that he has been positive for the last several weeks, asymptomatic. After appropriate general anesthesia was delivered, the patients abdomen and left lower extremity was prepped and draped in standard sterile fashion. Timeout performed and agreed upon, antibiotics given during this period. A left radial arterial line was placed under ultrasound guidance for intraoperative and postoperative blood pressure monitoring. I began by remaking the previous longitudinal incision in the left groin. Scar tissue was dissected down sharply and with cautery. Seroma pocket identified and opened, draining the clear serous contents. Significant scar tissue was encountered around the femoral sheath and common femoral artery. Multiple hemoclips identified around the vessel. After carefull sharp dissection I was able to get proximal and distal control of the common femoral artery for several centimeters and place vessel loops. The previous repair site was identified. A new arteriotomy was created below in a healthy portion of the common femoral artery transversely. A number 4 and 5 rui catheters were used to perform thrombectomy of the femoral and external iliac arteries. Significant clot was removed proximally and there was good back bleeding from the common femoral artery. However I was not able to get the rui catheter up proximally into the limb of the endograft. An 8-Macedonian sheath was then inserted thru the arteriotomy  In the common femoral artery. Using a glide wire and kmp catheter I was able to select the the left limb of the endograft and bring it above the flow divider. I exchanged for a V-18 wire and performed over the wire thrombectomy with a number 4 and 5 rui catheter. Severe narrowing was noted at the distal end of the left limb.   Hand injection angiogram revealed filling defects in the internal and external suspect compartment syndrome, therefore fasciotomy not performed and will monitor in the ICU. Wound bed irrigated and dried, overall hemostatic. Closed in multiple layers of interrupted vicryl and running monocryl. Steristrip and sterile dressings applied. Patient was then brought to the CVICU for recovery.       Electronically signed by Nadeem English MD on 3/11/2021 at 7:57 PM

## 2021-03-12 NOTE — CONSULTS
Kaelyn Benz, Grupo Lind, Rex, & Juan Alberto   Urology consultation note      Patient:  Bryce Arnold  MRN: [id number]  YOB: 1934    CHIEF COMPLAINT: Gross hematuria    HISTORY OF PRESENT ILLNESS:   The patient is a 80 y.o. male who presents with occluded left iliac limb. Patient underwent thrombectomy and placement of extension limb. Is currently on a heparin drip. Urology is consulted for gross hematuria. Per patient, no prior episodes of gross hematuria. Does have an extensive history of urinary urgency, frequency, dysuria and has been on Flomax, finasteride in the past.  He denies any prior urological surgeries. Extensive smoking history. Currently, urine is jyotsna-yellow with no clots or debris present. Patient had a prior positive urine culture at outside facility and is currently on Rocephin    Patient's old records, notes and chart reviewed and summarized above.     Past Medical History:    Past Medical History:   Diagnosis Date    [facility] ([facility]     Cancer ([facility]     skin (neck) removed    Pneumonia        Past Surgical History:    Past Surgical History:   Procedure Laterality Date    PROSTATE SURGERY      SKIN CANCER EXCISION         Medications:      Current Facility-Administered Medications:     lactated ringers infusion, , Intravenous, Continuous, Aimee Huynh DO, Last Rate: 50 mL/hr at 03/12/21 0451, New Bag at 03/12/21 0451    sodium chloride flush 0.9 % injection 10 mL, 10 mL, Intravenous, 2 times per day, Gissel Cane, DO, 10 mL at 03/11/21 2044    sodium chloride flush 0.9 % injection 10 mL, 10 mL, Intravenous, PRN, Gissel Cane, DO    ondansetron [facility]) injection 4 mg, 4 mg, Intravenous, Q6H PRN, Gissel Cane, DO    famotidine (PEPCID) injection 20 mg, 20 mg, Intravenous, BID, Gissel Cane, DO, 20 mg at 03/11/21 2051    heparin (porcine) injection 5,150 Units, 80 Units/kg, Intravenous, PRN, Gissel Cane, DO    heparin (porcine) injection 2,580 Units, 40 Units/kg, Intravenous, PRN, Geeta Krishna DO    heparin 25,000 units in dextrose 5% 250 mL (premix) infusion, 5-30 Units/kg/hr, Intravenous, Continuous, Geeta Krishna DO, Last Rate: 10.3 mL/hr at 03/12/21 0034, 16 Units/kg/hr at 03/12/21 0034    ipratropium-albuterol (DUONEB) nebulizer solution 1 ampule, 1 ampule, Inhalation, Q4H PRN, Geeta Krishna DO    metoprolol (LOPRESSOR) injection 5 mg, 5 mg, Intravenous, Q6H PRN, Geeta Krishna DO    acetaminophen (TYLENOL) tablet 1,000 mg, 1,000 mg, Oral, 3 times per day, Geeta Krishna DO, 1,000 mg at 03/12/21 0315    oxyCODONE (ROXICODONE) immediate release tablet 5 mg, 5 mg, Oral, Q4H PRN, Geeta Krishna DO    fentaNYL (SUBLIMAZE) injection 50 mcg, 50 mcg, Intravenous, Q2H PRN, Geeta Krishna DO    cefTRIAXone (ROCEPHIN) 1000 mg IVPB in 50 mL D5W minibag, 1,000 mg, Intravenous, Q24H, Geeta Krishna DO, Stopped at 03/11/21 2248    metoprolol tartrate (LOPRESSOR) tablet 25 mg, 25 mg, Oral, BID, Tamia Ragsdale MD    dilTIAZem 125 mg in dextrose 5 % 125 mL infusion, 5-15 mg/hr, Intravenous, Continuous, Tamia Ragsdale MD, Stopped at 03/11/21 2300    norepinephrine (LEVOPHED) 16 mg in sodium chloride 0.9 % 250 mL infusion, 2-100 mcg/min, Intravenous, Continuous, Ana Cristina Carlson DO, Last Rate: 9.4 mL/hr at 03/12/21 0457, 10 mcg/min at 03/12/21 0457    norepinephrine-sodium chloride (LEVOPHED) 16-0.9 MG/250ML-% infusion, , , ,     magnesium sulfate 1000 mg in dextrose 5% 100 mL IVPB, 1,000 mg, Intravenous, PRN, Tamia Ragsdale MD, Stopped at 03/12/21 0537    potassium chloride (KLOR-CON M) extended release tablet 40 mEq, 40 mEq, Oral, PRN **OR** potassium bicarb-citric acid (EFFER-K) effervescent tablet 40 mEq, 40 mEq, Oral, PRN **OR** potassium chloride 10 mEq/100 mL IVPB (Peripheral Line), 10 mEq, Intravenous, PRN, Kaneadicarla Ragsdale MD    Allergies:  No Known Allergies    Social History:   Social History     Socioeconomic History    Marital status:      Spouse name: Not on file    Number of children: Not on file    Years of education: Not on file    Highest education level: Not on file   Occupational History    Not on file   Social Needs    Financial resource strain: Not on file    Food insecurity     Worry: Not on file     Inability: Not on file    Transportation needs     Medical: Not on file     Non-medical: Not on file   Tobacco Use    Smoking status: Former Smoker     Types: Cigarettes    Smokeless tobacco: Former User   Substance and Sexual Activity    Alcohol use: Yes     Comment: rarely    Drug use: Never    Sexual activity: Not on file   Lifestyle    Physical activity     Days per week: Not on file     Minutes per session: Not on file    Stress: Not on file   Relationships    Social connections     Talks on phone: Not on file     Gets together: Not on file     Attends [employer] service: Not on file     Active member of club or organization: Not on file     Attends meetings of clubs or organizations: Not on file     Relationship status: Not on file    Intimate partner violence     Fear of current or ex partner: Not on file     Emotionally abused: Not on file     Physically abused: Not on file     Forced sexual activity: Not on file   Other Topics Concern    Not on file   Social History Narrative    Not on file       Family History:    Family History   Problem Relation Age of Onset    Diabetes Mother     Heart Attack Father     Heart Disease Father     Cancer Neg Hx     Kidney Disease Neg Hx     Stroke Neg Hx        REVIEW OF SYSTEMS:  A comprehensive 14 point review of systems was obtained. Constitutional: Due to COVID-19 crisis, unable to obtain    Physical Exam:      This a 80 y.o. female   Vitals:    03/12/21 0500   BP: (!) 124/52   Pulse: 91   Resp:    Temp:    SpO2: 98%     Constitutional: Patient in no acute distress. Neuro: alert and oriented to person place and time. Head: Atraumatic and normocephalic. Neck: Trachea midline.   Psych: Mood and affect normal.  Skin: No rashes or bruising present. Lungs: Respiratory effort normal.  Cardiovascular:  Regular rhythm. Abdomen: Soft, non-tender, non-distended. Neither side has CVA tenderness on exam.  Bladder non-tender and not distended. Lymphatics: no palpable lymphadenopathy  Pelvic exam: deferred. Rectal exam not indicated. Labs:  Recent Labs     03/10/21  1313 03/10/21  1744 03/11/21  1722 03/11/21  2215   WBC 28.5* 25.1*  --  11.8*   HGB 11.7* 11.9* 10.9 10.1*   HCT 37.9* 39.3* 33.7 31.4*   MCV 94.8* 96.3*  --  90.8    191  --  182     Recent Labs     03/10/21  1313 03/11/21  1722 03/11/21  2215    138 134*   K 5.5* 3.9 4.3     --  105   CO2 19*  --  23   PHOS  --   --  2.5   BUN 23*  --  27*   CREATININE 1.2  --  0.93       Recent Labs     03/11/21  0137   COLORU YELLOW   PHUR 6.0   WBCUA > 200   RBCUA 10-15   YEAST NONE SEEN   BACTERIA MODERATE   LEUKOCYTESUR MODERATE*   UROBILINOGEN 0.2   BILIRUBINUR NEGATIVE   BLOODU MODERATE*           -----------------------------------------------------------------  Imaging Results:  Xr Chest Portable    Result Date: 3/11/2021  EXAMINATION: ONE XRAY VIEW OF THE CHEST 3/11/2021 9:00 pm COMPARISON: None. HISTORY: ORDERING SYSTEM PROVIDED HISTORY: postop; BNP 89610 TECHNOLOGIST PROVIDED HISTORY: postop; BNP 43335 Reason for Exam: portable upright/ post op [id number] Acuity: Acute Type of Exam: Ongoing FINDINGS: Cardiomediastinal silhouette is normal in size. There is mild elevation of the right hemidiaphragm. There are linear bibasilar pulmonary opacities. There is right apical pleural and parenchymal scarring. No large pleural effusion or pneumothorax. No acute osseous abnormality. Right lateral pleural thickening with adjacent linear pulmonary opacity. No acute osseous abnormality. 1. Linear bibasilar opacities, likely atelectasis. 2. Right apical and lateral pleural and parenchymal scarring.        Assessment and Plan Impression:   problems:  Gross hematuria      Plan:   Patient has multiple risk factors for gross hematuria including initiation of heparin drip, smoking history, suspected bladder mass seen on CT angiogram.  Urine is currently jyotsna-yellow; again continue anticoagulation  Patient will need follow-up as outpatient for cystoscopy, bilateral retrograde pyelogram to complete gross hematuria work-up and evaluate collecting system for any underlying causes  Medical management per primary team  Irrigate catheter as needed  If patient is urine gets bloodier with clots, may need catheter upsized with initiation of continuous bladder irrigation        Francis Warren  5:37 AM 3/12/2021

## 2021-03-12 NOTE — PROGRESS NOTES
Division of Vascular Surgery         Progress Note      Name: Bryce Arnold  MRN: [id number]         Overnight Events:     Received dig for a fib rvr  On levo for hypotension  Patient uncooperative overnight and refusing central line placement    Subjective:     Seen and examined. Remains in A fib, no rvr. [facility], continuing to wean. Patient alert and oriented. Seems more agreeable to medical interventions this Susy Duane to be discharged home. Physical Exam:     Vitals:  BP (!) 124/52   Pulse 91   Temp 98.2 °F (36.8 °C) (Oral)   Resp 18   Ht 6' 2.02\" (1.88 m)   Wt 144 lb 6.4 oz (65.5 kg)   SpO2 98%   BMI 18.53 kg/m²     Physical Exam  Constitutional:       General: He is not in acute distress. HENT:      Head: Normocephalic and atraumatic. Mouth/Throat:      Mouth: Mucous membranes are moist.   Eyes:      Extraocular Movements: Extraocular movements intact. Pupils: Pupils are equal, round, and reactive to light. Neck:      Musculoskeletal: Normal range of motion. Cardiovascular:      Rate and Rhythm: Tachycardia present. Rhythm irregular. Pulmonary:      Effort: Pulmonary effort is normal. No respiratory distress. Abdominal:      General: There is no distension. Palpations: Abdomen is soft. Tenderness: There is no abdominal tenderness. Musculoskeletal:      Comments: L fem palpable, dressing c/d/i, L DP signal, no mottling, motor intact LLE   Skin:     General: Skin is warm and dry. Neurological:      General: No focal deficit present. Mental Status: He is alert.          Assessment/Plan:     Continue surgical critical care management  Pain controlled  Cards on board for a fib - rvr overnight now controlled, on levophed and weaning as able  Supplemental O2 as needed  OK for diet from Vascular standpoint although still requiring pressors  Maintain ivory at this time - urine is clearing up quite a bit but still appears to have some sediment, Urology on board and recommending OP cysto and pyelogram to work up suspicious bladder lesion  Follow up AM labs - CK and alisa downtrending  Replete lytes as needed  ID on board - rapid COVID positive, asx and also UTI on rocephin with improving leukocytosis  Will discuss timing of bridge back to coumadin  OK for OOB to chair  Continue ICU    Electronically signed by Colby Petit MD on 3/12/21 at 7:00 AM [address]: [phone]  C: [phone]  Email: [email or web address]

## 2021-03-12 NOTE — PROGRESS NOTES
Pt had a positive covid swab in beginning of February. Msg sent to ID to clarify if pt needs to be in Covid isolation or not. Awaiting response.

## 2021-03-12 NOTE — PROGRESS NOTES
Physician Progress Note      PATIENT:               Kulwinder Navas  CSN #:                  [id number]  :                       1934  ADMIT DATE:       3/10/2021 12:01 PM  [address] [place] DATE:        3/11/2021 2:35 PM  RESPONDING  PROVIDER #:        Jose Cruz Estevez MD          QUERY TEXT:    Dr Noreen Stern,    Patient admitted with limb ischemic left foot. Per Dietician pt met Severe   Malnutrition. If possible, please document in progress notes and discharge   summary if you are evaluating and /or treating any of the following: The medical record reflects the following:  Risk Factors: BMI 18  Clinical Indicators: ASPEN criteria: Energy Intake:  7 - 75% or less estimated   energy requirements for 1 month or longer  Weight Loss:  (10.6% in the last 2 months)  & Body Fat Loss:  7 - Severe body   fat loss Orbital  Muscle Mass Loss:  7 - Severe muscle mass loss Clavicles (pectoralis &   deltoids), Temples (temporalis) & Fluid Accumulation:  No significant fluid   accumulation  Treatment: Dietary Nutrition Supplements: Renal Oral Supplement  Options provided:  -- Severe Protein calorie malnutrition  -- Underweight with BMI 18  -- Other - I will add my own diagnosis  -- Disagree - Not applicable / Not valid  -- Disagree - Clinically unable to determine / Unknown  -- Refer to Clinical Documentation Reviewer    PROVIDER RESPONSE TEXT:    This patient has severe protein calorie malnutrition.     Query created by: Frankie Aviles on 3/12/2021 7:45 AM      Electronically signed by:  Jose Cruz Estevez MD 3/12/2021   9:05 AM

## 2021-03-12 NOTE — ACP (ADVANCE CARE PLANNING)
Advance Care Planning     Advance Care Planning (ACP) Physician/NP/PA Conversation    Date of Conversation: 3/12/2021  Conducted with: Patient is forgetful and drowsy today I spoke with him and also spoke with his wife Vicci Aschoff. Healthcare Decision Maker:  Patient is  to Vicci Aschoff and they have 3 biological children 1 son Blu Jacobson and 2 daughters Mechelle Ferrer, and Rob Craig. Patient has DPOA paperwork and Carmella Madrid is POA #1 and Blu Jacobson is POA#2.       Primary Decision Maker (Active): Nya Leonard - Spouse - [phone]        Care Preferences:    Hospitalization: \"If your health worsens and it becomes clear that your chance of recovery is unlikely, what would be your preference regarding hospitalization? \"  Patient is hospitalized     Ventilation: \"If you were unable to breath on your own and your chance of recovery was unlikely, what would be your preference about the use of a ventilator (breathing machine) if it was available to you? \"  Patient is a DNRCC-A no intubation     Resuscitation: \"In the event your heart stopped as a result of an underlying serious health condition, would you want attempts made to restart your heart, or would you prefer a natural death? \"  Patient is a DNRCC-A no intubation       Conversation Outcomes / Follow-Up Plan:  Palliative Interaction:  Called and spoke to patient wife Vicci Aschoff and introduced myself and my palliative care role.      We discussed patient diagnosis, medical history, and prognosis. I provided wife an opportunity to discuss her concerns, fears, and questions and provide emotional support to her.     Patient is  to Vicci Aschoff and they have 3 biological children 1 son Blu Jacobson and 2 daughters Mechelle Ferrer, and Rob Craig. Patient has [facility] paperwork and Carmella Madrid is POA #1 and Blu Jacobson is POA#2.     I discussed code status levels with patient wife Vicci Aschoff and explained each level completely with wife.  Patient wife states patient does not want CPR or intubation. Patient code status changed to [facility] no intubation. I also went and spoke with patient and he said he agreed with his wife. This conversation with wife and  witnessed with patient nurse Kim Curry. Paperwork completed and order placed in chart.     Patient wife states that she would like patient to go to [facility] at discharge.     Length of Voluntary ACP Conversation in minutes:  16 minutes    Rakel Coronado

## 2021-03-12 NOTE — PROGRESS NOTES
Patient seen and examined at bedside - notified that patient is refusing further treatments. Patient states that he just wants to be left alone and wants to sleep. Patient does not want the central line placed. He understands that he is on medications to keep his blood pressure up and understands the risks of having pressors infused through a peripheral IV. He wants to address getting a central line and other medications in the morning. Patient states that if he is going to die tonight to just let it happen. After speaking to the patient, discussed patient's wishes with patient's wife Lakshmi Gilliland. Per Lakshmi Gilliland, patient was on board with changing his CODE STATUS at [facility] and had expressed that he had wanted to proceed with surgical intervention for his leg. Lakshmi Talat states that she does not know what to say as this is a complete change from before. Lakshmigerry Gilliland initially told us to proceed with placing the central line; however, patient is of sound mind and alert and oriented x4. As Lakshmi Gilliland is not patient's POA and patient has expressed that he does not want a central line or any other procedures or additional IV access, at this time, we will continue to monitor him very closely. Will update patient's wife as able and if patient's clinical exam has changed.     Don Ambrosio D.O. PGY-3  Department of Surgery  3/12/2021, 12:07AM

## 2021-03-13 LAB
ABSOLUTE EOS #: 0.41 K/UL (ref 0–0.44)
ABSOLUTE IMMATURE GRANULOCYTE: 0.07 K/UL (ref 0–0.3)
ABSOLUTE LYMPH #: 1.37 K/UL (ref 1.1–3.7)
ABSOLUTE MONO #: 1.47 K/UL (ref 0.1–1.2)
ANION GAP SERPL CALCULATED.3IONS-SCNC: 9 MMOL/L (ref 9–17)
BASOPHILS # BLD: 1 % (ref 0–2)
BASOPHILS ABSOLUTE: 0.04 K/UL (ref 0–0.2)
BUN BLDV-MCNC: 13 MG/DL (ref 8–23)
BUN/CREAT BLD: ABNORMAL (ref 9–20)
CALCIUM IONIZED: 1.07 MMOL/L (ref 1.13–1.33)
CALCIUM SERPL-MCNC: 7.4 MG/DL (ref 8.6–10.4)
CHLORIDE BLD-SCNC: 106 MMOL/L (ref 98–107)
CO2: 23 MMOL/L (ref 20–31)
CREAT SERPL-MCNC: 0.66 MG/DL (ref 0.7–1.2)
CULTURE: ABNORMAL
DIFFERENTIAL TYPE: ABNORMAL
EOSINOPHILS RELATIVE PERCENT: 5 % (ref 1–4)
GFR AFRICAN AMERICAN: >60 ML/MIN
GFR NON-AFRICAN AMERICAN: >60 ML/MIN
GFR SERPL CREATININE-BSD FRML MDRD: ABNORMAL ML/MIN/{1.73_M2}
GFR SERPL CREATININE-BSD FRML MDRD: ABNORMAL ML/MIN/{1.73_M2}
GLUCOSE BLD-MCNC: 106 MG/DL (ref 70–99)
HCT VFR BLD CALC: 26.9 % (ref 40.7–50.3)
HEMOGLOBIN: 8.5 G/DL (ref 13–17)
IMMATURE GRANULOCYTES: 1 %
INR BLD: 1.8
LACTIC ACID, WHOLE BLOOD: 0.8 MMOL/L (ref 0.7–2.1)
LYMPHOCYTES # BLD: 16 % (ref 24–43)
Lab: ABNORMAL
MAGNESIUM: 1.7 MG/DL (ref 1.6–2.6)
MCH RBC QN AUTO: 29.2 PG (ref 25.2–33.5)
MCHC RBC AUTO-ENTMCNC: 31.6 G/DL (ref 28.4–34.8)
MCV RBC AUTO: 92.4 FL (ref 82.6–102.9)
MONOCYTES # BLD: 17 % (ref 3–12)
NRBC AUTOMATED: 0 PER 100 WBC
PDW BLD-RTO: 15 % (ref 11.8–14.4)
PHOSPHORUS: 1.7 MG/DL (ref 2.5–4.5)
PLATELET # BLD: 151 K/UL (ref 138–453)
PLATELET ESTIMATE: ABNORMAL
PMV BLD AUTO: 9.5 FL (ref 8.1–13.5)
POTASSIUM SERPL-SCNC: 3.8 MMOL/L (ref 3.7–5.3)
PROTHROMBIN TIME: 18 SEC (ref 9.1–12.3)
RBC # BLD: 2.91 M/UL (ref 4.21–5.77)
RBC # BLD: ABNORMAL 10*6/UL
SEG NEUTROPHILS: 60 % (ref 36–65)
SEGMENTED NEUTROPHILS ABSOLUTE COUNT: 5.44 K/UL (ref 1.5–8.1)
SODIUM BLD-SCNC: 138 MMOL/L (ref 135–144)
SPECIMEN DESCRIPTION: ABNORMAL
WBC # BLD: 8.8 K/UL (ref 3.5–11.3)
WBC # BLD: ABNORMAL 10*3/UL

## 2021-03-13 PROCEDURE — 97167 OT EVAL HIGH COMPLEX 60 MIN: CPT

## 2021-03-13 PROCEDURE — 97535 SELF CARE MNGMENT TRAINING: CPT

## 2021-03-13 PROCEDURE — 97530 THERAPEUTIC ACTIVITIES: CPT

## 2021-03-13 PROCEDURE — 83735 ASSAY OF MAGNESIUM: CPT

## 2021-03-13 PROCEDURE — 6370000000 HC RX 637 (ALT 250 FOR IP): Performed by: STUDENT IN AN ORGANIZED HEALTH CARE EDUCATION/TRAINING PROGRAM

## 2021-03-13 PROCEDURE — 97162 PT EVAL MOD COMPLEX 30 MIN: CPT

## 2021-03-13 PROCEDURE — 2580000003 HC RX 258: Performed by: STUDENT IN AN ORGANIZED HEALTH CARE EDUCATION/TRAINING PROGRAM

## 2021-03-13 PROCEDURE — 84100 ASSAY OF PHOSPHORUS: CPT

## 2021-03-13 PROCEDURE — 51702 INSERT TEMP BLADDER CATH: CPT

## 2021-03-13 PROCEDURE — 80048 BASIC METABOLIC PNL TOTAL CA: CPT

## 2021-03-13 PROCEDURE — 2500000003 HC RX 250 WO HCPCS: Performed by: STUDENT IN AN ORGANIZED HEALTH CARE EDUCATION/TRAINING PROGRAM

## 2021-03-13 PROCEDURE — 2060000000 HC ICU INTERMEDIATE R&B

## 2021-03-13 PROCEDURE — 36415 COLL VENOUS BLD VENIPUNCTURE: CPT

## 2021-03-13 PROCEDURE — 6360000002 HC RX W HCPCS: Performed by: STUDENT IN AN ORGANIZED HEALTH CARE EDUCATION/TRAINING PROGRAM

## 2021-03-13 PROCEDURE — 83605 ASSAY OF LACTIC ACID: CPT

## 2021-03-13 PROCEDURE — 99232 SBSQ HOSP IP/OBS MODERATE 35: CPT | Performed by: INTERNAL MEDICINE

## 2021-03-13 PROCEDURE — 85025 COMPLETE CBC W/AUTO DIFF WBC: CPT

## 2021-03-13 PROCEDURE — 2700000000 HC OXYGEN THERAPY PER DAY

## 2021-03-13 PROCEDURE — 85610 PROTHROMBIN TIME: CPT

## 2021-03-13 PROCEDURE — 82330 ASSAY OF CALCIUM: CPT

## 2021-03-13 PROCEDURE — 51798 US URINE CAPACITY MEASURE: CPT

## 2021-03-13 PROCEDURE — 94761 N-INVAS EAR/PLS OXIMETRY MLT: CPT

## 2021-03-13 RX ORDER — TAMSULOSIN HYDROCHLORIDE 0.4 MG/1
0.4 CAPSULE ORAL DAILY
Qty: 30 CAPSULE | Refills: 0 | Status: SHIPPED | OUTPATIENT
Start: 2021-03-13

## 2021-03-13 RX ORDER — TAMSULOSIN HYDROCHLORIDE 0.4 MG/1
0.4 CAPSULE ORAL DAILY
Status: DISCONTINUED | OUTPATIENT
Start: 2021-03-13 | End: 2021-03-15 | Stop reason: HOSPADM

## 2021-03-13 RX ORDER — DIGOXIN 125 MCG
125 TABLET ORAL DAILY
Status: DISCONTINUED | OUTPATIENT
Start: 2021-03-13 | End: 2021-03-15 | Stop reason: HOSPADM

## 2021-03-13 RX ORDER — WARFARIN SODIUM 3 MG/1
6 TABLET ORAL
Status: ACTIVE | OUTPATIENT
Start: 2021-03-13 | End: 2021-03-14

## 2021-03-13 RX ORDER — CALCIUM CARBONATE 200(500)MG
1000 TABLET,CHEWABLE ORAL ONCE
Status: DISCONTINUED | OUTPATIENT
Start: 2021-03-13 | End: 2021-03-14

## 2021-03-13 RX ADMIN — ACETAMINOPHEN 1000 MG: 500 TABLET ORAL at 23:29

## 2021-03-13 RX ADMIN — ACETAMINOPHEN 1000 MG: 500 TABLET ORAL at 05:22

## 2021-03-13 RX ADMIN — MIDODRINE HYDROCHLORIDE 10 MG: 5 TABLET ORAL at 17:52

## 2021-03-13 RX ADMIN — POTASSIUM & SODIUM PHOSPHATES POWDER PACK 280-160-250 MG 250 MG: 280-160-250 PACK at 08:42

## 2021-03-13 RX ADMIN — ENOXAPARIN SODIUM 70 MG: 80 INJECTION SUBCUTANEOUS at 23:28

## 2021-03-13 RX ADMIN — POTASSIUM & SODIUM PHOSPHATES POWDER PACK 280-160-250 MG 250 MG: 280-160-250 PACK at 17:52

## 2021-03-13 RX ADMIN — SODIUM CHLORIDE, PRESERVATIVE FREE 10 ML: 5 INJECTION INTRAVENOUS at 08:42

## 2021-03-13 RX ADMIN — MIDODRINE HYDROCHLORIDE 10 MG: 5 TABLET ORAL at 12:02

## 2021-03-13 RX ADMIN — FAMOTIDINE 20 MG: 10 INJECTION INTRAVENOUS at 23:28

## 2021-03-13 RX ADMIN — POTASSIUM & SODIUM PHOSPHATES POWDER PACK 280-160-250 MG 250 MG: 280-160-250 PACK at 23:29

## 2021-03-13 RX ADMIN — ENOXAPARIN SODIUM 70 MG: 80 INJECTION SUBCUTANEOUS at 08:42

## 2021-03-13 RX ADMIN — TAMSULOSIN HYDROCHLORIDE 0.4 MG: 0.4 CAPSULE ORAL at 09:47

## 2021-03-13 RX ADMIN — MIDODRINE HYDROCHLORIDE 10 MG: 5 TABLET ORAL at 08:42

## 2021-03-13 RX ADMIN — POTASSIUM BICARBONATE 40 MEQ: 782 TABLET, EFFERVESCENT ORAL at 08:45

## 2021-03-13 RX ADMIN — CEFTRIAXONE SODIUM 1000 MG: 1 INJECTION, POWDER, FOR SOLUTION INTRAMUSCULAR; INTRAVENOUS at 23:28

## 2021-03-13 RX ADMIN — ACETAMINOPHEN 1000 MG: 500 TABLET ORAL at 14:03

## 2021-03-13 RX ADMIN — FAMOTIDINE 20 MG: 10 INJECTION INTRAVENOUS at 08:42

## 2021-03-13 RX ADMIN — MAGNESIUM GLUCONATE 500 MG ORAL TABLET 400 MG: 500 TABLET ORAL at 08:42

## 2021-03-13 RX ADMIN — POTASSIUM & SODIUM PHOSPHATES POWDER PACK 280-160-250 MG 250 MG: 280-160-250 PACK at 14:03

## 2021-03-13 RX ADMIN — SODIUM CHLORIDE, PRESERVATIVE FREE 10 ML: 5 INJECTION INTRAVENOUS at 23:29

## 2021-03-13 ASSESSMENT — PAIN SCALES - GENERAL
PAINLEVEL_OUTOF10: 0
PAINLEVEL_OUTOF10: 2
PAINLEVEL_OUTOF10: 0
PAINLEVEL_OUTOF10: 0

## 2021-03-13 ASSESSMENT — ENCOUNTER SYMPTOMS
COLOR CHANGE: 0
ABDOMINAL DISTENTION: 0
EYE DISCHARGE: 0
CHEST TIGHTNESS: 0

## 2021-03-13 NOTE — PROGRESS NOTES
[facility]   Progress Note                   Date:   3/13/2021  Patient name: Melany Sequeira  Date of admission:  3/11/2021  3:14 PM  MRN:   [id number]  YOB: 1934  PCP: No primary care provider on file. Reason for Admission:      Subjective: There were no acute events overnight, remained hemodynamically stable, denies chest pain, dyspnea, orthopnea or palpitations. Patient's blood pressure is on lower side. Map is in 46s to 62s. Patient refused central line. Was on Levophed yesterday. Patient was started on digoxin for rate control for atrial fibrillation. Patient refused digoxin. Medications:   Scheduled Meds:   calcium carbonate  1,000 mg Oral Once    potassium & sodium phosphates  1 packet Oral 4x Daily    tamsulosin  0.4 mg Oral Daily    warfarin  6 mg Oral Once    digoxin  125 mcg Oral Daily    midodrine  10 mg Oral TID WC    enoxaparin  1 mg/kg Subcutaneous BID    warfarin (COUMADIN) daily dosing (placeholder)   Other RX Placeholder    warfarin  6 mg Oral Once    sodium chloride flush  10 mL Intravenous 2 times per day    famotidine (PEPCID) injection  20 mg Intravenous BID    acetaminophen  1,000 mg Oral 3 times per day    cefTRIAXone (ROCEPHIN) IV  1,000 mg Intravenous Q24H       Continuous Infusions:      CBC:   Recent Labs     03/10/21  1744 03/10/21  1744 03/11/21  2215 03/12/21  1154 03/13/21  0832   WBC 25.1*  --  11.8*  --  8.8   HGB 11.9*   < > 10.1* 9.6* 8.5*     --  182  --  151    < > = values in this interval not displayed. BMP:    Recent Labs     03/11/21  2215 03/12/21  0616 03/13/21  0417   * 137 138   K 4.3 4.4 3.8    104 106   CO2 23 23 23   BUN 27* 25* 13   CREATININE 0.93 1.00 0.66*   GLUCOSE 117* 148* 106*     Hepatic:   No results for input(s): AST, ALT, ALB, BILITOT, ALKPHOS in the last 72 hours. Troponin: No results for input(s): TROPONINI in the last 72 hours.   BNP: No results for input(s): BNP in the last 72 hours. Lipids: No results for input(s): CHOL, HDL in the last 72 hours. Invalid input(s): LDLCALCU  INR:   Recent Labs     03/11/21  0431 03/12/21  1144 03/13/21  0417   INR 2.23* 1.7 1.8       Objective:   Vitals: BP (!) 116/56   Pulse 101   Temp 98.1 °F (36.7 °C) (Axillary)   Resp 18   Ht 6' 2.02\" (1.88 m)   Wt 145 lb 8.1 oz (66 kg)   SpO2 98%   BMI 18.67 kg/m²     General appearance: awake, alert, in no apparent respiratory distress   HEENT: Head: Normocephalic, no lesions, without obvious abnormality  Neck: no JVD  Lungs: clear to auscultation bilaterally, no basilar rales, no wheezing   Heart: regular rate and rhythm, S1, S2 normal, no murmur, click, rub or gallop  Abdomen: soft, non-tender; bowel sounds normal  Extremities: No LE edema  Neurologic: Mental status: Alert, oriented. Motor and sensory not done. EKG:       Echocardiogram:      Coronary Angiography:         Assessment / Acute Cardiac Problems:       IMPRESSION:    1. Permanent A fib   2. Acute limb ischemia s/p open thrombectomy of left iliac limb and aortic endograft & open thrombectomy of left external iliac and femoral arteries   3. AAA s/p EVAR  4. HLD  5. HTN     RECOMMENDATIONS:  1. Currently off pressors. MAP around 50s. Patient refused central line. 2. Hold home Toprol XL dose while requiring pressors  3. Started on oral digoxin 125 mcg daily. Patient refused digoxin. 4. On coumadin. INR 1.8  5. K>4, Mg>2  6. Rest per primary team.       Discussed with patient and Nurse. Discussed with patient and nursing. Rebecca Conroy MD  Fellow, Cardiovascular Diseases   [address] [facility]   Pager - [phone]    I performed a history and physical examination of the patient and discussed management with the resident. I reviewed the residents note and agree with the documented findings and plan of care. Any areas of disagreement are noted on the chart.  I was personally present for the key portions of any procedures. I have documented in the chart those procedures where I was not present during the key portions. I have personally evaluated this patient and have completed at least one if not all key elements of the E/M (history, physical exam, and MDM). Additional findings are as noted. Patient refused to take digoxin. afib rate controlled. Continue current medications.     Eliezer Coyle MD

## 2021-03-13 NOTE — PROGRESS NOTES
positive, asx and also UTI on rocephin with improving leukocytosis, follow up urine cx, so far non lactose fermenting gram negative rods  Coumadin bridging  OK for OOB to chair  Dispo planning - appreciate CM assistance    Electronically signed by Alejandra Gilbert MD on 3/12/21 at 7:00 AM [facility]      [address]: [phone]  C: [phone]  Email: [email or web address]

## 2021-03-13 NOTE — PROGRESS NOTES
Physical Therapy    Facility/Department: [facility] CAR 1  Initial Assessment    NAME: Melany Sequeira  : 1934  MRN: [id number]    Date of Service: 3/13/2021  HPI:      80year old male with atrial fibrillation on coumadin s/p recent AAA repair with biiliac stenting who presented to [facility] yesterday with complaints of worsening numbness in left leg. Patient states that his legs are always cold but over the last day, he had worsening feelings of pins and needles, cold and numbness to the left leg. Denies pain at present. Does have motor intact. Denies any other complaints at this time, no CP, SOB, abdominal pain, N/V.     Imaging performed at [facility] shows occluded left iliac limb with poor runoff to remainder of LLE. Patient was previously comfort care and started on heparin gtt. Patient and wife decided on code status change and desire intervention to attempt limb salvage. Patient was transferred to [facility] for intervention.   Date of Procedure: 3/11/2021     Pre-Op Diagnosis: LEFT LEG ISCHEMIA     Post-Op Diagnosis: Occlusion of left iliac endograft limb, with severe stenosis of iliac iliac aretry       Procedure:  1) Redo exposure of left common femoral artery  2) Open thrombectomy of left iliac limb and aortic endograft  3) Open thrombectomy of left external iliac and femoral arteries  4) Aortogram, left lower extremity angiogram  5) Delayed placement of left iliac limb extension    Discharge Recommendations:  Patient would benefit from continued therapy after discharge   PT Equipment Recommendations  Equipment Needed: No    Assessment   Body structures, Functions, Activity limitations: Decreased functional mobility ; Decreased balance;Decreased strength;Decreased safe awareness;Decreased cognition;Decreased endurance  Assessment: Pt required Mod /Max A for mobility. Jackie salinas was used to transfer patient. Pt would benefit from continued PT to improve strength and mobility.   Prognosis: Good  Decision Making: Medium Complexity  PT Education: Plan of Care;General Safety  Barriers to Learning: Cognition  REQUIRES PT FOLLOW UP: Yes  Activity Tolerance  Activity Tolerance: Patient limited by fatigue;Patient limited by endurance; Patient limited by cognitive status       Patient Diagnosis(es): There were no encounter diagnoses. has a past medical history of Atrial fibrillation ([facility] [address] Cancer ([facility] [address] and Pneumonia. has a past surgical history that includes Skin cancer excision; Prostate surgery; and femoral bypass (Left, 3/11/2021). Restrictions  Restrictions/Precautions  Restrictions/Precautions: Up as Tolerated  Required Braces or Orthoses?: No  Vision/Hearing  Vision: Impaired  Vision Exceptions: Wears glasses at all times  Hearing: Exceptions to [facility]  Hearing Exceptions: Hard of hearing/hearing concerns     Subjective  General  Patient assessed for rehabilitation services?: Yes  Family / Caregiver Present: No  Follows Commands: Within Functional Limits  Other (Comment): With increased time. General Comment  Comments: Pt on 2 L 02. Sp02 at 96%. 02 removed. Subjective  Subjective: Pt wanting to go to the toilet. Pain Screening  Patient Currently in Pain: Denies  Vital Signs  Patient Currently in Pain: Denies       Orientation  Orientation  Overall Orientation Status: Impaired  Orientation Level: Oriented to person;Disoriented to place; Disoriented to time;Disoriented to situation  Social/Functional History  Social/Functional History  Lives With: Spouse  Type of Home: House  Home Layout: Two level, Able to Live on Main level with bedroom/bathroom  Home Access: Stairs to enter without rails  Entrance Stairs - Number of Steps: 2  Home Equipment: Rolling walker, Cane, Oxygen  Receives Help From: Family  Active : No  Occupation: Retired  Type of occupation:   Additional Comments: Information from case management note. Pt admitted from a facility. Unknown prior function.   Cognition Cognition  Overall Cognitive Status: Exceptions  Arousal/Alertness: Delayed responses to stimuli  Following Commands: Follows one step commands with increased time  Attention Span: Attends with cues to redirect  Memory: Decreased recall of biographical Information;Decreased recall of recent events  Safety Judgement: Decreased awareness of need for assistance;Decreased awareness of need for safety  Insights: Decreased awareness of deficits    Objective          AROM RLE (degrees)  RLE AROM: WFL  AROM LLE (degrees)  LLE AROM : WFL  AROM RUE (degrees)  RUE AROM : WFL  AROM LUE (degrees)  LUE AROM : WFL  Strength Other  Other: Moves all extremities antigravity. Motor Control  Gross Motor?: [facility](Pt able to feed himself.)  Sensation  Overall Sensation Status: [facility]  Bed mobility  Rolling to Right: Moderate assistance  Supine to Sit: Moderate assistance  Scooting: Moderate assistance  Pt required Mod A to sit at EOB. Transfers  Sit to Stand: Moderate Assistance  Stand to sit: Moderate Assistance  Pt stood from bed using M[employer]. Pt was then transferred to toilet. Pt stood from toilet requiring Mod A +2 x 4 reps. Pt kept needing to urinate. Pt is incontinent of urine. Pt was then transferred to chair using [employer]. Pt left off 02. Sp02 at 96%. Ambulation  Ambulation?: No     Balance  Sitting - Static: Fair;+  Sitting - Dynamic: Fair  Standing - Static: Fair;+  Standing - Dynamic: Fair  Comments: Standing balance with Migue Shack.         Plan   Plan  Times per week: 5-6x/week  Current Treatment Recommendations: Strengthening, Safety Education & Training, Balance Training, Endurance Training, Functional Mobility Training, Transfer Training, Gait Training, Cognitive Reorientation  Safety Devices  Type of devices: Left in chair, Call light within reach, Nurse notified, Chair alarm in place    AM-PAC Score     AM-PAC Inpatient Mobility without Stair Climbing Raw Score : 8 (03/13/21 1053)  [address] without Stair Climbing T-Scale Score : 30.65 (03/13/21 1053)  Mobility Inpatient CMS 0-100% Score: 80.91 (03/13/21 1053)  Mobility Inpatient without Stair CMS G-Code Modifier : CM (03/13/21 1053)       Goals  Short term goals  Time Frame for Short term goals: 14 visits  Short term goal 1: Pt to perform bed mobility CGA  Short term goal 2: Pt to transfer CGA  Short term goal 3: Pt to ambulate with rolling walker 150 ft Min A  Short term goal 4: Pt to tolerate 30 minutes of activity to improve strength and endurance.   Patient Goals   Patient goals : Unable to state       Therapy Time   Individual Concurrent Group Co-treatment   Time In 8056         Time Out 0932         Minutes 38         Timed Code Treatment Minutes: 30 [facility],

## 2021-03-13 NOTE — DISCHARGE INSTR - COC
Continuity of Care Form    Patient Name: Mahad Delacruz   :  1934  MRN:  [id number]    Admit date:  3/11/2021  Discharge date:  03/15/2021    Code Status Order: DNR-CCA   Advance Directives:      Admitting Physician:  Ana Luisa Williamson MD  PCP: No primary care provider on file. Discharging Nurse: [facility] Unit/Room#: 4863/5096-43  Discharging Unit Phone Number: [phone]    Emergency Contact:   Extended Emergency Contact Information  Primary Emergency Contact: [address] Phone: [phone]  Relation: Spouse    Past Surgical History:  Past Surgical History:   Procedure Laterality Date    FEMORAL BYPASS Left 3/11/2021    LEFT LEG THROMBECTOMY LEFT LIMB STENT GRAFT performed by Ana Luisa Williamson MD at [facility]         Immunization History: There is no immunization history on file for this patient.     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    Status post abdominal aortic aneurysm (AAA) repair W85.767, Z86.79       Isolation/Infection:   Isolation            No Isolation          Patient Infection Status       Infection Onset Added Last Indicated Last Indicated By Review Planned Expiration Resolved Resolved By    None active    Resolved    COVID-19 21 COVID-19, Rapid   21 Dagmar Hutchinson RN    Positive , outside of isolation window            Nurse Assessment:  Last Vital Signs: BP (!) 116/56   Pulse 101   Temp 98.1 °F (36.7 °C) (Axillary)   Resp 18   Ht 6' 2.02\" (1.88 m)   Wt 145 lb 8.1 oz (66 kg)   SpO2 98%   BMI 18.67 kg/m²     Last documented pain score (0-10 scale): Pain Level: 0  Last Weight:   Wt Readings from Last 1 Encounters:   21 145 lb 8.1 oz (66 kg)     Mental Status: disoriented and alert    IV Access:  - None    Nursing Mobility/ADLs:  Walking   Assisted  Transfer  Assisted  Bathing  Assisted  Dressing  Assisted  Toileting  Assisted  Feeding  Independent  Med Admin  Assisted  Med Delivery   whole    Wound Care Documentation and Therapy:  Wound 03/10/21 Coccyx nonblanchable redness (Active)   Wound Etiology Pressure Stage  2 03/13/21 1600   Dressing Status Clean;Dry; Intact 03/13/21 1600   Wound Cleansed Soap and water 03/13/21 1600   Dressing/Treatment Protective barrier; Foam 03/13/21 1600   Dressing Change Due 03/15/21 03/13/21 1600   Wound Assessment Non-blanchable erythema;Pink/red 03/13/21 1600   Drainage Amount None 03/13/21 1600   Odor None 03/13/21 1600   Xiao-wound Assessment Non-blanchable erythema 03/13/21 1600   Number of days: 2        Elimination:  Continence:   · Bowel: No  · Bladder: No  Urinary Catheter: Insertion Date: 3/13   Colostomy/Ileostomy/Ileal Conduit: No       Date of Last BM: 3/14    Intake/Output Summary (Last 24 hours) at 3/13/2021 1645  Last data filed at 3/13/2021 1600  Gross per 24 hour   Intake 2894 ml   Output 835 ml   Net 2059 ml     I/O last 3 completed shifts: In: 2894 [P.O.:620; I.V.:2274]  Out: 850 [Urine:850]    Safety Concerns:     Sundowners [place], History of Falls (last 30 days) and At Risk for Falls    Impairments/Disabilities:      Vision and Hearing    Nutrition Therapy:  Current Nutrition Therapy:   - Oral Diet:  General    Routes of Feeding: Oral  Liquids: No Restrictions  Daily Fluid Restriction: no  Last Modified Barium Swallow with Video (Video Swallowing Test): not done    Treatments at the Time of Hospital Discharge:   Respiratory Treatments: ***  Oxygen Therapy:  is on oxygen at 2 L/min per nasal cannula.   Ventilator:    - No ventilator support    Rehab Therapies: Physical Therapy and Occupational Therapy  Weight Bearing Status/Restrictions: No weight bearing restirctions  Other Medical Equipment (for information only,

## 2021-03-13 NOTE — CONSULTS
Kaelyn Bird, Estephanie No, Rex, & Juan Alberto   Urology consultation note      Patient:  Denver Nascimento  MRN: [id number]  YOB: 1934    CHIEF COMPLAINT: Gross hematuria    HISTORY OF PRESENT ILLNESS:   The patient is a 80 y.o. male who presents with occluded left iliac limb. Patient underwent thrombectomy and placement of extension limb. Is currently on a heparin drip. Urology is consulted for gross hematuria. Per patient, no prior episodes of gross hematuria. Does have an extensive history of urinary urgency, frequency, dysuria and has been on Flomax, finasteride in the past.  He denies any prior urological surgeries. Extensive smoking history. Currently, urine is jyotsna-yellow with no clots or debris present. Patient had a prior positive urine culture at outside facility and is currently on Rocephin    Patient's old records, notes and chart reviewed and summarized above.     Past Medical History:    Past Medical History:   Diagnosis Date    Atrial fibrillation ([facility] [address]     Cancer ([facility] [address]     skin (neck) removed    Pneumonia        Past Surgical History:    Past Surgical History:   Procedure Laterality Date    FEMORAL BYPASS Left 3/11/2021    LEFT LEG THROMBECTOMY LEFT LIMB STENT GRAFT performed by Vivi Cornejo MD at [facility]         Medications:      Current Facility-Administered Medications:     calcium carbonate (TUMS) chewable tablet 1,000 mg, 1,000 mg, Oral, Once, Abdirizak Carr MD    potassium & sodium phosphates (PHOS-NAK) 280-160-250 MG packet 250 mg, 1 packet, Oral, 4x Daily, Abdirizak Carr MD, 250 mg at 03/13/21 1403    tamsulosin (FLOMAX) capsule 0.4 mg, 0.4 mg, Oral, Daily, Abdirizak Carr MD, 0.4 mg at 03/13/21 0947    warfarin (COUMADIN) tablet 6 mg, 6 mg, Oral, Once, Abdirizak Carr MD    midodrine (PROAMATINE) tablet 10 mg, 10 mg, Oral, TID Mitesh PORTER DO, 10 mg at 03/13/21 1202    enoxaparin (LOVENOX) injection 70 mg, 1 mg/kg, Subcutaneous, BID, Carlos Perry MD, 70 mg at 03/13/21 9844    warfarin (COUMADIN) daily dosing (placeholder), , Other, Gamal Jeff MD, Given at 03/12/21 2230    warfarin (COUMADIN) tablet 6 mg, 6 mg, Oral, Once, Carlos Perry MD    sodium chloride flush 0.9 % injection 10 mL, 10 mL, Intravenous, 2 times per day, Jaron Frey DO, 10 mL at 03/13/21 5002    sodium chloride flush 0.9 % injection 10 mL, 10 mL, Intravenous, PRN, Jaron Frey DO    [facility]ansetron [facility]) injection 4 mg, 4 mg, Intravenous, Q6H PRN, Jaron Frey DO    famotidine (PEPCID) injection 20 mg, 20 mg, Intravenous, BID, Jaron Frey DO, 20 mg at 03/13/21 0842    ipratropium-albuterol (DUONEB) nebulizer solution 1 ampule, 1 ampule, Inhalation, Q4H PRN, Jaron Frey DO    metoprolol (LOPRESSOR) injection 5 mg, 5 mg, Intravenous, Q6H PRN, Jaron Frey DO    acetaminophen (TYLENOL) tablet 1,000 mg, 1,000 mg, Oral, 3 times per day, Jaron Frey DO, 1,000 mg at 03/13/21 1403    oxyCODONE (ROXICODONE) immediate release tablet 5 mg, 5 mg, Oral, Q4H PRN, Jaron Frey DO    cefTRIAXone (ROCEPHIN) 1000 mg IVPB in 50 mL D5W minibag, 1,000 mg, Intravenous, Q24H, Harsh Jorgensen DO, Stopped at 03/12/21 2049    Allergies:  No Known Allergies    Social History:   Social History     Socioeconomic History    Marital status:      Spouse name: Not on file    Number of children: Not on file    Years of education: Not on file    Highest education level: Not on file   Occupational History    Not on file   Social Needs    Financial resource strain: Not on file    Food insecurity     Worry: Not on file     Inability: Not on file   [employer] needs     Medical: Not on file     Non-medical: Not on file   Tobacco Use    Smoking status: Former Smoker     Types: Cigarettes    Smokeless tobacco: Former User   Substance and Sexual Activity    Alcohol use: Yes     Comment: rarely    Drug use: Never    Sexual activity: Not on file   Lifestyle    Physical activity     Days per week: Not on file     Minutes per session: Not on file    Stress: Not on file   Relationships    Social connections     Talks on phone: Not on file     Gets together: Not on file     Attends [employer] service: Not on file     Active member of club or organization: Not on file     Attends meetings of clubs or organizations: Not on file     Relationship status: Not on file    Intimate partner violence     Fear of current or ex partner: Not on file     Emotionally abused: Not on file     Physically abused: Not on file     Forced sexual activity: Not on file   Other Topics Concern    Not on file   Social History Narrative    Not on file       Family History:    Family History   Problem Relation Age of Onset    Diabetes Mother     Heart Attack Father     Heart Disease Father     Cancer Neg Hx     Kidney Disease Neg Hx     Stroke Neg Hx        REVIEW OF SYSTEMS:  A comprehensive 14 point review of systems was obtained. Constitutional: Due to COVID-19 crisis, unable to obtain    Physical Exam:      This a 80 y.o. female   Vitals:    03/13/21 1205   BP: (!) 86/36   Pulse:    Resp:    Temp:    SpO2:      Constitutional: Patient in no acute distress. Neuro: alert and oriented to person place and time. Head: Atraumatic and normocephalic. Neck: Trachea midline. Psych: Mood and affect normal.  Skin: No rashes or bruising present. Lungs: Respiratory effort normal.  Cardiovascular:  Regular rhythm. Abdomen: Soft, non-tender, non-distended. Neither side has CVA tenderness on exam.  Bladder non-tender and not distended. Lymphatics: no palpable lymphadenopathy  Pelvic exam: deferred. Rectal exam not indicated.     Labs:  Recent Labs     03/10/21  1744 03/10/21  1744 03/11/21  2215 03/12/21  1154 03/13/21  0832   WBC 25.1*  --  11.8*  --  8.8   HGB 11.9*   < > 10.1* 9.6* 8.5*   HCT 39.3*   < > 31.4* 29.8* 26.9*   MCV 96.3*  --  90.8  -- 92.4     --  182  --  151    < > = values in this interval not displayed. Recent Labs     03/11/21  2215 03/12/21  0616 03/13/21  0417   * 137 138   K 4.3 4.4 3.8    104 106   CO2 23 23 23   PHOS 2.5 1.8* 1.7*   BUN 27* 25* 13   CREATININE 0.93 1.00 0.66*       Recent Labs     03/11/21  0137   COLORU YELLOW   PHUR 6.0   WBCUA > 200   RBCUA 10-15   YEAST NONE SEEN   BACTERIA MODERATE   LEUKOCYTESUR MODERATE*   UROBILINOGEN 0.2   BILIRUBINUR NEGATIVE   BLOODU MODERATE*           -----------------------------------------------------------------  Imaging Results:  Xr Chest Portable    Result Date: 3/11/2021  EXAMINATION: ONE XRAY VIEW OF THE CHEST 3/11/2021 9:00 pm COMPARISON: None. HISTORY: ORDERING SYSTEM PROVIDED HISTORY: postop; BNP 14369 TECHNOLOGIST PROVIDED HISTORY: postop; BNP [id number] Reason for Exam: portable upright/ post op [id number] Acuity: Acute Type of Exam: Ongoing FINDINGS: Cardiomediastinal silhouette is normal in size. There is mild elevation of the right hemidiaphragm. There are linear bibasilar pulmonary opacities. There is right apical pleural and parenchymal scarring. No large pleural effusion or pneumothorax. No acute osseous abnormality. Right lateral pleural thickening with adjacent linear pulmonary opacity. No acute osseous abnormality. 1. Linear bibasilar opacities, likely atelectasis. 2. Right apical and lateral pleural and parenchymal scarring. Assessment and Plan   Impression:   problems:  Gross hematuria      Plan:   Patient has multiple risk factors for gross hematuria including initiation of heparin drip, smoking history, suspected bladder mass seen on CT angiogram.  Urine is currently jyotsna-yellow; again continue anticoagulation    Medical management per primary team    Irrigate catheter as needed.  If patient is urine gets bloodier with clots, may need catheter upsized with initiation of continuous bladder irrigation    Patient will need follow-up

## 2021-03-13 NOTE — PROGRESS NOTES
Occupational Therapy   Occupational Therapy Initial Assessment  Date: 3/13/2021   Patient Name: Lew Neri  MRN: [id number]     : 1934    Date of Service: 3/13/2021    Discharge Recommendations:  Patient would benefit from continued therapy after discharge  OT Equipment Recommendations  Equipment Needed: (CTA)  Copied from Vascular:  Post-Op Diagnosis: Occlusion of left iliac endograft limb, with severe stenosis of iliac iliac aretry       Procedure:  1) Redo exposure of left common femoral artery  2) Open thrombectomy of left iliac limb and aortic endograft  3) Open thrombectomy of left external iliac and femoral arteries  4) Aortogram, left lower extremity angiogram  5) Delayed placement of left iliac limb extension  Assessment    Pt sitting on toilet with PT upon entrance to room. Sit to stand with mod assistance x 2 for hygiene. T used jayme stedy from bed to toilet. Please refer to below assist levels for adl completion. Pt ed on OT POC, safety awareness tech, proper hand placement for transfers, with poor return, question pts level of understanding. Pt is confused and would ask several times, \"Where am I?\". Pt requires an extended time to complete tasks and requires verbal cuing for follow through. Pt was pleasantly confused throughout session. Pt retired supine in bed with call light and phone in reach. All needs met upon exit. Performance deficits / Impairments: Decreased functional mobility ; Decreased safe awareness;Decreased balance;Decreased ADL status; Decreased cognition;Decreased posture;Decreased endurance;Decreased high-level IADLs  Treatment Diagnosis: PVD/Ischemic left leg  Prognosis: Fair  Decision Making: High Complexity  OT Education: OT Role;Plan of Care  Patient Education: Pt ed on OT POC, safety awareness tech, proper hand placement for transfers, with poor return, question pts level of understanding.  Pt is confused  Barriers to Learning: Question pts level of understanding  REQUIRES OT FOLLOW UP: Yes  Activity Tolerance  Activity Tolerance: Treatment limited secondary to decreased cognition  Safety Devices  Safety Devices in place: Yes  Type of devices: Call light within reach; Left in chair;Nurse notified;Gait belt; Chair alarm in place  Restraints  Initially in place: No         Patient Diagnosis(es): There were no encounter diagnoses. has a past medical history of Atrial fibrillation ([facility] [address] Cancer ([facility] [address] and Pneumonia. has a past surgical history that includes Skin cancer excision; Prostate surgery; and femoral bypass (Left, 3/11/2021).     Treatment Diagnosis: PVD/Ischemic left leg      Restrictions  Restrictions/Precautions  Restrictions/Precautions: Up as Tolerated, Fall Risk  Required Braces or Orthoses?: No    Subjective   General  Patient assessed for rehabilitation services?: Yes  Family / Caregiver Present: No  Patient Currently in Pain: No  Pain Assessment  Pain Assessment: 0-10  Pain Level: 0  Patient Currently in Pain: No  Oxygen Therapy  SpO2: 98 %  Pulse Oximeter Device Mode: Intermittent  Pulse Oximeter Device Location: Left;Finger  O2 Device: None (Room air)  Skin Assessment: Clean, dry, & intact  Social/Functional History  Social/Functional History  Lives With: Spouse  Type of Home: House  Home Layout: Two level, Able to Live on Main level with bedroom/bathroom  Home Access: Stairs to enter without rails  Entrance Stairs - Number of Steps: 2 steps to enter  Bathroom Shower/Tub: Tub/Shower unit, Curtain  Bathroom Toilet: Standard  Home Equipment: Rolling walker, Cane, Oxygen  Receives Help From: Family  ADL Assistance: Needs assistance  Homemaking Assistance: Needs assistance  Homemaking Responsibilities: No  Ambulation Assistance: Needs assistance  Transfer Assistance: Needs assistance  Active : No  Occupation: Retired  Type of occupation:   Leisure & Hobbies: [address] [employer], used to read books when vision was better  Additional Comments: Information from case management note. Per chart, pt admitted from [facility]. 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)    Orientation  Overall Orientation Status: Impaired  Orientation Level: Oriented to person;Disoriented to time;Disoriented to situation;Disoriented to place(reoriented pt)     Balance  Sitting Balance: Contact guard assistance(pt with forward flexed posture)  Standing Balance  Time: ~4 min total with 4  static stands  Activity: for hygiene  Toilet Transfers  Toilet Transfer: Moderate assistance;2 Person assistance  Toilet Transfers Comments: jayme salinas used to transfer from bed to toilet  ADL  Feeding: Minimal assistance;Setup;Verbal cueing; Increased time to complete  Grooming: Minimal assistance;Setup;Verbal cueing; Increased time to complete  UE Bathing: Moderate assistance;Setup;Verbal cueing; Increased time to complete  LE Bathing: Maximum assistance;Setup;Verbal cueing; Increased time to complete  UE Dressing: Moderate assistance;Setup;Verbal cueing; Increased time to complete(to thread BUE through arm holes and tie gown in back)  LE Dressing: Maximum assistance;Setup;Verbal cueing; Increased time to complete(don/doff B hosp socks)  Toileting: Dependent/Total(pt transferred to toilet vis jayme leblanc. Pt urinated every time pt stood from toilet 4 x. Pt incontinent of urine.)  Tone RUE  RUE Tone: Normotonic  Tone LUE  LUE Tone: Normotonic  Coordination  Movements Are Fluid And Coordinated: No  Coordination and Movement description: Gross motor impairments;Right UE;Left UE;Decreased accuracy; Decreased speed;Fine motor impairments     Bed mobility  Rolling to Right: Moderate assistance  Supine to Sit: Moderate assistance  Scooting:  Moderate assistance  Transfers  Sit to stand: 2 Person assistance; Moderate assistance  Stand to sit: 2 Person assistance; Moderate assistance  Transfer Comments: jayme salinas used to transfer from bed to toilet     Cognition  Overall Cognitive Status: Exceptions  Arousal/Alertness: Delayed responses to stimuli  Following Commands: Follows one step commands with increased time  Attention Span: Attends with cues to redirect  Memory: Decreased recall of biographical Information;Decreased recall of recent events  Safety Judgement: Decreased awareness of need for assistance;Decreased awareness of need for safety  Insights: Decreased awareness of deficits     Sensation  Overall Sensation Status: WFL(denies numbness/tingling B hands)     LUE PROM (degrees)  LUE PROM: WFL  LUE AROM (degrees)  LUE AROM : WFL  Left Hand PROM (degrees)  Left Hand PROM: WFL  Left Hand AROM (degrees)  Left Hand AROM: WFL  RUE PROM (degrees)  RUE PROM: WFL  RUE AROM (degrees)  RUE AROM : WFL  Right Hand PROM (degrees)  Right Hand PROM: WFL  Right Hand AROM (degrees)  Right Hand AROM: WFL  LUE Strength  Gross LUE Strength: WFL  L Hand General: 4-/5  LUE Strength Comment: overall muscle strength is 4-/5. Pt with increased difficulty following MMT requiring verbal cuing for follow through. RUE Strength  Gross RUE Strength: WFL  R Hand General: 4-/5  RUE Strength Comment: overall muscle strength is 4-/5. Pt with increased difficulty following MMT requiring verbal cuing for follow through.      Plan   Plan  Times per week: 3-4 x week  Current Treatment Recommendations: Endurance Training, Strengthening, Patient/Caregiver Education & Training, Cognitive Reorientation, Self-Care / ADL, Functional Mobility Training, Safety Education & Training, Balance Training       AM-PAC Score  AM-[facility] Inpatient Daily Activity Raw Score: 15 (03/13/21 1654)  AM-PAC Inpatient ADL T-Scale Score : 34.69 (03/13/21 1654)  ADL Inpatient CMS 0-100% Score: 56.46 (03/13/21 1654)  ADL Inpatient CMS G-Code Modifier : CK (03/13/21 2463)    Goals  Short term goals  Time Frame for Short term goals: Pt will, by discharge:  Short term goal 1: Dem Min a with UB adls  Short term goal 2: Dem min a for bed mobility  Short term goal 3: Sit on eob 15 min with good unsupported sitting balance  Short term goal 4: Dem min a for sit to stand transfer  Short term goal 5: Be alert and oriented x 2 in 2/4 session with verbal cuing as needed       Therapy Time   Individual Concurrent Group Co-treatment   Time In 0908         Time Out 0931         Minutes 23         Timed Code Treatment Minutes: 10 Minutes   Co Eval with PT    Damion Mcconnell, OTR/L

## 2021-03-13 NOTE — PROGRESS NOTES
ICU PROGRESS NOTE        PATIENT NAME: [id number] [facility] RECORD NO. [id number]  DATE: 3/13/2021    PRIMARY CARE PHYSICIAN: No primary care provider on file. HD: # 2    ASSESSMENT    Patient Active Problem List   Diagnosis    PVD (peripheral vascular disease) ([facility] [address]    Severe malnutrition ([facility] [address]    Ischemic leg    Iliac artery occlusion, left (HCC)    Status post abdominal aortic aneurysm (AAA) repair       MEDICAL DECISION MAKING AND PLAN  1. Neuro:  1. Pain/ sedation - pain well controlled, fentanyl 50mcg Q2h prn  2. GCS 15, A&Ox4  3. Intact sensation b/l upper and lower extremities    2. CV  1. AFIB w/ RVR - resolved  1. Cardiology consulted, continue therapeutic heparin, bridge to warfarin when appropriate per vascular  2. Hypotension - improving  1. Started on midodrine 1mg TID  2. Weaned off levophed      3. POD#2 s/p left femoral artery cutdown and open thrombectomy of left iliac artery endograft limb, placement of left iliac limb extension   1. Vascular managing as primary. Palpable fem. DP with good signals    3. Pulm  1. O2 sat 96-98% on RA    4. GI/Nutrition  1. General diet  2. pepcid 20mg IV BID    5. Renal/lytes  1. I/O: 1220/1300 over past 24hours  2. UOP: 0.8 cc/kg/hr  3. IVF: NJ@ 50cc/hr, increase to 100cc  4. M.9, repleted with 1g IV mag sulfate  5. Phos: 1.9, repleted  6. Hematuria - urology following. Luevano removed yesterday    6. Heme  1. DVT prophylaxis- on therapeutic heparin gtt  2. Heparin gtt - PTT 48.1, trend PTT Q6  7. Endocrine        1. Glucose <200, no insulin required    7. Micro  1. Tmax - 37.2  2. WBC 8.8  3. Rocephin for suspected UTI -stop date today  1. Culture positive for serratia marsecans - ID and urology following  4. COVID +, clear from isolation per ID    8. Family/dispo  1. DNR-CCA  2. Trauma/Critical Care to sign off.     CHECKLIST    CAM-ICU RASS: 0  RESTRAINTS: Not indicated  IVF: LR @100cc/hr  NUTRITION: General diet  ANTIBIOTICS: Rocephin  GI: Pepcid BID  DVT: Heparin  GLYCEMIC CONTROL: Not indicated  HOB >45: yes  MOBILITY: PT/OT    Chief Complaint: \"Leg numbness\"    SUBJECTIVE    Mahad Delacruz is an 79yo M transferred from [address] [place] [address] for occlusion of left femoral artery, aortic endograft and left iliac limb. Patient is POD#2 s/p thrombectomy per vascular surgery. Patient with known history of Afib, had RVR post-operatively. Initially started on cardizem gtt but became hypotensive. Cardizem d/c and given digoxin 250mg IV per cardiology. Rate now well controlled, but persistent afib. Urology consulted for bladder mass. Patient without any complaints this morning. Weaned off of levophed. Luevano d/c. Critical care to sign off at this time      OBJECTIVE  VITALS: Temp: Temp: 97.3 °F (36.3 °C)Temp  Av.1 °F (36.7 °C)  Min: 97.3 °F (36.3 °C)  Max: 99.4 °F (91.2 °C) BP Systolic (64WAN), QXO:10 , Min:63 , EXZ:042   Diastolic (81JCP), ULO:41, Min:28, Max:121   Pulse Pulse  Av.1  Min: 70  Max: 107 Resp Resp  Av.3  Min: 16  Max: 20 Pulse ox SpO2  Av.3 %  Min: 88 %  Max: 99 %    Physical Exam  Constitutional:    A&Ox3, no apparent distress, frail appearing elderly male    HENT:      Head: Atraumatic, normocephalic     Neck: JVD present   Eyes:      Pupils: PERRL     EOMI  Cardiovascular:      Rate and Rhythm: Irregular rhythm, normal rate     Pulses: Palpable bilateral fems.  Left DP with dopplerable signal.  Pulmonary:      Effort: Normal, Equal chest rise b/l     Breath sounds: CTA b/l  Abdominal:      General: Soft, Non-tender, non-distended     Palpations: Non-tender, no organomegaly  Skin:     General: Clean, dry, no rash or erythema noted      Drain/tube output:      LAB:  CBC:   Recent Labs     03/10/21  1744 03/10/21  1744 21  2215 21  1154 21  0832   WBC 25.1*  --  11.8*  --  8.8   HGB 11.9*   < > 10.1* 9.6* 8.5*   HCT 39.3*   < > 31.4* 29.8* 26.9*   MCV 96.3*  --  90.8  --  92.4     --  182  --  151    < > = values in this interval not displayed. BMP:   Recent Labs     03/11/21  2215 03/12/21  0616 03/13/21  0417   * 137 138   K 4.3 4.4 3.8    104 106   CO2 23 23 23   BUN 27* 25* 13   CREATININE 0.93 1.00 0.66*   GLUCOSE 117* 148* 106*         RADIOLOGY:  CXR:   Xr Chest Portable    Result Date: 3/11/2021  EXAMINATION: ONE XRAY VIEW OF THE CHEST 3/11/2021 9:00 pm COMPARISON: None. HISTORY: ORDERING SYSTEM PROVIDED HISTORY: postop; BNP [id number] TECHNOLOGIST PROVIDED HISTORY: postop; BNP [id number] Reason for Exam: portable upright/ post op [id number] Acuity: Acute Type of Exam: Ongoing FINDINGS: Cardiomediastinal silhouette is normal in size. There is mild elevation of the right hemidiaphragm. There are linear bibasilar pulmonary opacities. There is right apical pleural and parenchymal scarring. No large pleural effusion or pneumothorax. No acute osseous abnormality. Right lateral pleural thickening with adjacent linear pulmonary opacity. No acute osseous abnormality.          [facility],   3/12/21, 1:08 PM

## 2021-03-13 NOTE — FLOWSHEET NOTE
RN/ Writer spoke with vascular resident Neisha Yanez around 0730 in regards to discontinuing Art line, levophed gtt, and placing a ivory. I informed resident pts MAP was 36 this morning and did correlate w/ cuff pressures. Vascular still okay with plan. RN will discontinue to art line, levophed gtt, and replace ivory. RN will continue to monitor and notify team of any changes.          Michelle Lang RN

## 2021-03-13 NOTE — PLAN OF CARE
Problem: Airway Clearance - Ineffective  Goal: Achieve or maintain patent airway  3/13/2021 1228 by Shalini Cassidy RN  Outcome: Ongoing  3/13/2021 0016 by Grazyna Pierce RN  Outcome: Ongoing     Problem: Gas Exchange - Impaired  Goal: Absence of hypoxia  3/13/2021 1228 by Shalini Cassidy RN  Outcome: Ongoing  3/13/2021 0016 by Grazyna Pierce RN  Outcome: Ongoing  Goal: Promote optimal lung function  3/13/2021 1228 by Shalini Cassidy RN  Outcome: Ongoing  3/13/2021 0016 by Grazyna Pierce RN  Outcome: Ongoing     Problem: Breathing Pattern - Ineffective  Goal: Ability to achieve and maintain a regular respiratory rate  3/13/2021 1228 by Shalini Cassidy RN  Outcome: Ongoing  3/13/2021 0016 by Grazyna Pierce RN  Outcome: Ongoing     Problem: Body Temperature -  Risk of, Imbalanced  Goal: Ability to maintain a body temperature within defined limits  3/13/2021 1228 by Shalini Cassidy RN  Outcome: Ongoing  3/13/2021 0016 by Grazyna Pierce RN  Outcome: Ongoing  Goal: Will regain or maintain usual level of consciousness  3/13/2021 1228 by Shalini Cassidy RN  Outcome: Ongoing  3/13/2021 0016 by Grazyna Pierce RN  Outcome: Ongoing  Goal: Complications related to the disease process, condition or treatment will be avoided or minimized  3/13/2021 1228 by Shalini Cassidy RN  Outcome: Ongoing  3/13/2021 0016 by Grazyna Pierce RN  Outcome: Ongoing     Problem:  Body Temperature -  Risk of, Imbalanced  Goal: Ability to maintain a body temperature within defined limits  3/13/2021 1228 by Shalini Cassidy RN  Outcome: Ongoing  3/13/2021 0016 by Grazyna Pierce RN  Outcome: Ongoing  Goal: Will regain or maintain usual level of consciousness  3/13/2021 1228 by Shalini Cassidy RN  Outcome: Ongoing  3/13/2021 0016 by Grazyna Pierce RN  Outcome: Ongoing  Goal: Complications related to the disease process, condition or treatment will be avoided or minimized  3/13/2021 1228 by Dereck Silvestre RN  Outcome: Ongoing  3/13/2021 0016 by Shar Kerr RN  Outcome: Ongoing     Problem: Isolation Precautions - Risk of Spread of Infection  Goal: Prevent transmission of infection  3/13/2021 1228 by Dereck Silvestre RN  Outcome: Ongoing  3/13/2021 0016 by Shar Kerr RN  Outcome: Ongoing     Problem: Loneliness or Risk for Loneliness  Goal: Demonstrate positive use of time alone when socialization is not possible  3/13/2021 1228 by Dereck Silvestre RN  Outcome: Ongoing  3/13/2021 0016 by Shar Kerr RN  Outcome: Ongoing     Problem: Fatigue  Goal: Verbalize increase energy and improved vitality  3/13/2021 1228 by Dereck Silvestre RN  Outcome: Ongoing  3/13/2021 0016 by Shar Kerr RN  Outcome: Ongoing     Problem: Patient Education: Go to Patient Education Activity  Goal: Patient/Family Education  3/13/2021 1228 by Dereck Silvestre RN  Outcome: Ongoing  3/13/2021 0016 by Shar Kerr RN  Outcome: Ongoing

## 2021-03-13 NOTE — PROGRESS NOTES
Pharmacy Note  Warfarin Consult follow-up      Recent Labs     03/13/21  0417   INR 1.8     Recent Labs     03/10/21  1744 03/10/21  1744 03/11/21  2215 03/12/21  1154 03/13/21  0832   HGB 11.9*   < > 10.1* 9.6* 8.5*   HCT 39.3*   < > 31.4* 29.8* 26.9*     --  182  --  151    < > = values in this interval not displayed. Current warfarin drug-drug interactions: Lovenox 1mg/kg, acetaminophen        Notes: INR = 1.8. Will order 6 mg today. Daily PT/INR while inpatient. Thank you.

## 2021-03-13 NOTE — PLAN OF CARE
Problem:  Body Temperature -  Risk of, Imbalanced  Goal: Ability to maintain a body temperature within defined limits  Outcome: Ongoing  Goal: Will regain or maintain usual level of consciousness  Outcome: Ongoing  Goal: Complications related to the disease process, condition or treatment will be avoided or minimized  Outcome: Ongoing     Problem: Nutrition Deficits  Goal: Optimize nutritional status  Outcome: Ongoing     Problem: Risk for Fluid Volume Deficit  Goal: Maintain normal heart rhythm  Outcome: Ongoing  Goal: Maintain absence of muscle cramping  Outcome: Ongoing  Goal: Maintain normal serum potassium, sodium, calcium, phosphorus, and pH  Outcome: Ongoing     Problem: Loneliness or Risk for Loneliness  Goal: Demonstrate positive use of time alone when socialization is not possible  Outcome: Ongoing     Problem: Fatigue  Goal: Verbalize increase energy and improved vitality  Outcome: Ongoing     Problem: Falls - Risk of:  Goal: Will remain free from falls  Description: Will remain free from falls  Outcome: Ongoing  Goal: Absence of physical injury  Description: Absence of physical injury  Outcome: Ongoing     Problem: Skin Integrity:  Goal: Will show no infection signs and symptoms  Description: Will show no infection signs and symptoms  Outcome: Ongoing  Goal: Absence of new skin breakdown  Description: Absence of new skin breakdown  Outcome: Ongoing

## 2021-03-13 NOTE — PROGRESS NOTES
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  · Emergent thrombectomy 3/11  · Recent AAA with EVAR  · Leukocytosis likely reactive to ischemia - improved  · Possible UTI  · Covid positive since at least 2/22 - asymptomatic    Other:  · Atrial fibrillation  · Hard to hear  Discussion / summary of stay / plan of care   ·   Recommendations   · No need for covid isolation  · 3/11 Ceftriaxone 1 g a day, -till 3/17  · [id number] Mon[address] Yanes for DC  · I suspect most of the leukocytosis was actually from ischemia of the left leg mostly  · reconsille    Infection Control Recommendations   · [place] Precautions  · Contact Isolation   · Airborne isolation  · Droplet Isolation    Antimicrobial Stewardship Recommendations   · Simplification of therapy  · Targeted therapy    Coordination ofOutpatient Care:   · Estimated Length of IV antimicrobials:  · Patient will need Midline / picc Catheter Insertion:   · Patient will need SNF:  · Patient will need outpatient wound care:     History of Present Illness:   Initial history:  Ramona Wynne is a 80y.o.-year-old male transferred from [place] because of ischemic left leg he has had a recent EVAR for a 5.6 cm AAA with bilateral femoral exposure at [facility]. .  CT scan shows occlusion of the left endograft with a chronic superficial femoral artery occlusion significant stenosis at the distal end of the left iliac art. Upon admission his white count was 25, urine analysis showed some blood and concern for infection, urine culture recently done continues to be pending, urine culture was requested at [facility]    Patient was taken to surgery emergently by vascular.   Had open thrombectomy left iliac artery, aortic endograft, left external iliac and femoral artery 3/11. Chest x-ray shows atelectasis  Covid tested positive - but has no resp symptoms or smell or taste issues - hx + covid 2/22, and was isolated at the time - was an easy case. Infectious disease consult was placed for leukocytosis, urine infection, Covid positive    Repeat white count down to 11    Interval changes  3/13/2021   Patient Vitals for the past 8 hrs:   BP Temp Temp src Pulse Resp SpO2   03/13/21 1545 (!) 116/56 98.1 °F (36.7 °C) Axillary 101 18 98 %   03/13/21 1205 (!) 86/36 -- -- -- -- --   03/13/21 1204 (!) 86/36 -- -- 80 -- 96 %   03/13/21 1200 (!) 79/37 97.3 °F (36.3 °C) Axillary 70 18 96 %   03/13/21 1145 (!) 86/39 97.4 °F (36.3 °C) Axillary 81 18 98 %   03/13/21 1100 -- -- -- -- -- 98 %   03/13/21 1000 (!) 88/39 -- -- 83 -- 97 %     No fever  Feels better  No rash  Foot well colored  U cx serratia S ceftriaxone    Summary of relevant labs:  Labs:  WBC 25-11 - 8.8  Micro:  Urine analysis with RBCs and concern for infection   Urine culture 3/11 serratia S cipro and ceftriaxone  covid + 3/11    imaging:  Chest x-ray atelectasis and scarring    I have personally reviewed the past medical history, past surgical history, medications, social history, and family history, and I haveupdated the database accordingly. Allergies:   Patient has no known allergies. Review of Systems:     Review of Systems   Constitutional: Negative for activity change. HENT: Negative for congestion. Eyes: Negative for discharge. Respiratory: Negative for chest tightness. Cardiovascular: Negative for palpitations. Gastrointestinal: Negative for abdominal distention. Endocrine: Negative for cold intolerance. Genitourinary: Positive for difficulty urinating. Negative for dysuria and frequency. Musculoskeletal: Negative for arthralgias. Skin: Positive for wound. Negative for color change. Allergic/Immunologic: Negative for environmental allergies and food allergies.    Neurological: Negative for dizziness, seizures and facial asymmetry. Hematological: Negative for adenopathy. Psychiatric/Behavioral: Negative for agitation. Physical Examination :       Physical Exam  Constitutional:       Appearance: Normal appearance. HENT:      Head: Normocephalic and atraumatic. Mouth/Throat:      Mouth: Mucous membranes are moist.   Eyes:      General: No scleral icterus. Conjunctiva/sclera: Conjunctivae normal.   Neck:      Musculoskeletal: Neck supple. No neck rigidity. Cardiovascular:      Rate and Rhythm: Normal rate and regular rhythm. Pulses: Normal pulses. Heart sounds: Normal heart sounds. Pulmonary:      Effort: No respiratory distress. Breath sounds: Normal breath sounds. Abdominal:      General: There is no distension. Palpations: There is no mass. Genitourinary:     Comments: + ivory  Musculoskeletal:         General: No swelling or deformity. Comments: Left foot warm and discoloration of the toes improved - warming blanket   Skin:     Coloration: Skin is not jaundiced or pale. Findings: No bruising or erythema. Neurological:      General: No focal deficit present. Mental Status: He is alert. Mental status is at baseline. Psychiatric:         Mood and Affect: Mood normal.         Thought Content:  Thought content normal.         Past Medical History:     Past Medical History:   Diagnosis Date    Atrial fibrillation ([address]     Cancer ([facility] [address]     skin (neck) removed    Pneumonia        Past Surgical  History:     Past Surgical History:   Procedure Laterality Date    FEMORAL BYPASS Left 3/11/2021    LEFT LEG THROMBECTOMY LEFT LIMB STENT GRAFT performed by Lethia Goodell, MD at [facility]         Medications:      calcium carbonate  1,000 mg Oral Once    potassium & sodium phosphates  1 packet Oral 4x Daily    tamsulosin  0.4 mg Oral Daily    warfarin  6 mg Oral Once    digoxin  125 mcg Oral Daily    midodrine  10 mg Oral TID WC    enoxaparin  1 mg/kg Subcutaneous BID    warfarin (COUMADIN) daily dosing (placeholder)   Other RX Placeholder    warfarin  6 mg Oral Once    sodium chloride flush  10 mL Intravenous 2 times per day    famotidine (PEPCID) injection  20 mg Intravenous BID    acetaminophen  1,000 mg Oral 3 times per day    cefTRIAXone (ROCEPHIN) IV  1,000 mg Intravenous Q24H       Social History:     Social History     Socioeconomic History    Marital status:      Spouse name: Not on file    Number of children: Not on file    Years of education: Not on file    Highest education level: Not on file   Occupational History    Not on file   Social Needs    Financial resource strain: Not on file    Food insecurity     Worry: Not on file     Inability: Not on file   [employer] needs     Medical: Not on file     Non-medical: Not on file   Tobacco Use    Smoking status: Former Smoker     Types: Cigarettes    Smokeless tobacco: Former User   Substance and Sexual Activity    Alcohol use: Yes     Comment: rarely    Drug use: Never    Sexual activity: Not on file   Lifestyle    Physical activity     Days per week: Not on file     Minutes per session: Not on file    Stress: Not on file   Relationships    Social connections     Talks on phone: Not on file     Gets together: Not on file     Attends Mu-ism service: Not on file     Active member of club or organization: Not on file     Attends meetings of clubs or organizations: Not on file     Relationship status: Not on file    Intimate partner violence     Fear of current or ex partner: Not on file     Emotionally abused: Not on file     Physically abused: Not on file     Forced sexual activity: Not on file   Other Topics Concern    Not on file   Social History Narrative    Not on file       Family History:     Family History   Problem Relation Age of Onset    Diabetes Mother     Heart Attack Father     Heart Disease Father     Cancer Neg Hx     Kidney Disease Neg Hx     Stroke Neg Hx       Medical Decision Making:   I have independently reviewed/ordered the following labs:    CBC with Differential:   Recent Labs     03/11/21  2215 03/12/21  1154 03/13/21  0832   WBC 11.8*  --  8.8   HGB 10.1* 9.6* 8.5*   HCT 31.4* 29.8* 26.9*     --  151   LYMPHOPCT 7*  --  16*   MONOPCT 10*  --  17*     BMP:  Recent Labs     03/12/21  0616 03/13/21  0417    138   K 4.4 3.8    106   CO2 23 23   BUN 25* 13   CREATININE 1.00 0.66*   MG 1.9 1.7     Hepatic Function Panel:   No results for input(s): PROT, LABALBU, BILIDIR, IBILI, BILITOT, ALKPHOS, ALT, AST in the last 72 hours. No results for input(s): RPR in the last 72 hours. No results for input(s): HIV in the last 72 hours. No results for input(s): BC in the last 72 hours. Lab Results   Component Value Date    CREATININE 0.66 03/13/2021    GLUCOSE 106 03/13/2021       Detailed results: Thank you for allowing us to participate in the care of this patient. Please call with questions. This note is created with the assistance of a speech recognition program.  While intending to generate adocument that actually reflects the content of the visit, the document can still have some errors including those of syntax and sound a like substitutions which may escape proof reading. It such instances, actual meaningcan be extrapolated by contextual diversion.     Isabella Jensen MD  Office: [phone]  Perfect serve / office [phone]

## 2021-03-13 NOTE — CARE COORDINATION
Transitional planning-talked with Dr. Josh Ross about patient returning to [facility] without a repeat COVID test. called [facility] and talked with Judy-OK for patient to return-doesn't need COVID test to return. PS Dr. Walker Camera to see if patient can be D/C'd and to have Dash Conway signed and reconcile meds.

## 2021-03-14 LAB
ANION GAP SERPL CALCULATED.3IONS-SCNC: 8 MMOL/L (ref 9–17)
BUN BLDV-MCNC: 14 MG/DL (ref 8–23)
BUN/CREAT BLD: ABNORMAL (ref 9–20)
CALCIUM IONIZED: 1.11 MMOL/L (ref 1.13–1.33)
CALCIUM SERPL-MCNC: 7.7 MG/DL (ref 8.6–10.4)
CHLORIDE BLD-SCNC: 105 MMOL/L (ref 98–107)
CO2: 23 MMOL/L (ref 20–31)
CREAT SERPL-MCNC: 0.57 MG/DL (ref 0.7–1.2)
GFR AFRICAN AMERICAN: >60 ML/MIN
GFR NON-AFRICAN AMERICAN: >60 ML/MIN
GFR SERPL CREATININE-BSD FRML MDRD: ABNORMAL ML/MIN/{1.73_M2}
GFR SERPL CREATININE-BSD FRML MDRD: ABNORMAL ML/MIN/{1.73_M2}
GLUCOSE BLD-MCNC: 146 MG/DL (ref 70–99)
INR BLD: 2.2
LACTIC ACID, WHOLE BLOOD: 1.4 MMOL/L (ref 0.7–2.1)
MAGNESIUM: 1.6 MG/DL (ref 1.6–2.6)
PHOSPHORUS: 1.7 MG/DL (ref 2.5–4.5)
POTASSIUM SERPL-SCNC: 3.8 MMOL/L (ref 3.7–5.3)
PROTHROMBIN TIME: 22.4 SEC (ref 9.1–12.3)
SODIUM BLD-SCNC: 136 MMOL/L (ref 135–144)

## 2021-03-14 PROCEDURE — 94761 N-INVAS EAR/PLS OXIMETRY MLT: CPT

## 2021-03-14 PROCEDURE — 2500000003 HC RX 250 WO HCPCS: Performed by: STUDENT IN AN ORGANIZED HEALTH CARE EDUCATION/TRAINING PROGRAM

## 2021-03-14 PROCEDURE — 80048 BASIC METABOLIC PNL TOTAL CA: CPT

## 2021-03-14 PROCEDURE — 6370000000 HC RX 637 (ALT 250 FOR IP): Performed by: STUDENT IN AN ORGANIZED HEALTH CARE EDUCATION/TRAINING PROGRAM

## 2021-03-14 PROCEDURE — 6360000002 HC RX W HCPCS: Performed by: STUDENT IN AN ORGANIZED HEALTH CARE EDUCATION/TRAINING PROGRAM

## 2021-03-14 PROCEDURE — 83605 ASSAY OF LACTIC ACID: CPT

## 2021-03-14 PROCEDURE — 36415 COLL VENOUS BLD VENIPUNCTURE: CPT

## 2021-03-14 PROCEDURE — 85610 PROTHROMBIN TIME: CPT

## 2021-03-14 PROCEDURE — 82330 ASSAY OF CALCIUM: CPT

## 2021-03-14 PROCEDURE — 84100 ASSAY OF PHOSPHORUS: CPT

## 2021-03-14 PROCEDURE — 83735 ASSAY OF MAGNESIUM: CPT

## 2021-03-14 PROCEDURE — 2580000003 HC RX 258: Performed by: STUDENT IN AN ORGANIZED HEALTH CARE EDUCATION/TRAINING PROGRAM

## 2021-03-14 PROCEDURE — 2700000000 HC OXYGEN THERAPY PER DAY

## 2021-03-14 PROCEDURE — 2060000000 HC ICU INTERMEDIATE R&B

## 2021-03-14 RX ORDER — CALCIUM CARBONATE 200(500)MG
1000 TABLET,CHEWABLE ORAL ONCE
Status: COMPLETED | OUTPATIENT
Start: 2021-03-14 | End: 2021-03-14

## 2021-03-14 RX ORDER — WARFARIN SODIUM 2 MG/1
4 TABLET ORAL
Status: COMPLETED | OUTPATIENT
Start: 2021-03-14 | End: 2021-03-14

## 2021-03-14 RX ADMIN — ACETAMINOPHEN 1000 MG: 500 TABLET ORAL at 20:24

## 2021-03-14 RX ADMIN — POTASSIUM & SODIUM PHOSPHATES POWDER PACK 280-160-250 MG 250 MG: 280-160-250 PACK at 13:51

## 2021-03-14 RX ADMIN — CALCIUM CARBONATE 1000 MG: 500 TABLET, CHEWABLE ORAL at 10:45

## 2021-03-14 RX ADMIN — ACETAMINOPHEN 1000 MG: 500 TABLET ORAL at 06:18

## 2021-03-14 RX ADMIN — POTASSIUM & SODIUM PHOSPHATES POWDER PACK 280-160-250 MG 250 MG: 280-160-250 PACK at 10:42

## 2021-03-14 RX ADMIN — ACETAMINOPHEN 1000 MG: 500 TABLET ORAL at 13:51

## 2021-03-14 RX ADMIN — SODIUM CHLORIDE, PRESERVATIVE FREE 10 ML: 5 INJECTION INTRAVENOUS at 20:25

## 2021-03-14 RX ADMIN — POTASSIUM & SODIUM PHOSPHATES POWDER PACK 280-160-250 MG 250 MG: 280-160-250 PACK at 20:24

## 2021-03-14 RX ADMIN — MIDODRINE HYDROCHLORIDE 10 MG: 5 TABLET ORAL at 12:38

## 2021-03-14 RX ADMIN — WARFARIN SODIUM 4 MG: 2 TABLET ORAL at 20:23

## 2021-03-14 RX ADMIN — MAGNESIUM GLUCONATE 500 MG ORAL TABLET 400 MG: 500 TABLET ORAL at 09:35

## 2021-03-14 RX ADMIN — TAMSULOSIN HYDROCHLORIDE 0.4 MG: 0.4 CAPSULE ORAL at 09:35

## 2021-03-14 RX ADMIN — SODIUM CHLORIDE, PRESERVATIVE FREE 10 ML: 5 INJECTION INTRAVENOUS at 09:00

## 2021-03-14 RX ADMIN — CEFTRIAXONE SODIUM 1000 MG: 1 INJECTION, POWDER, FOR SOLUTION INTRAMUSCULAR; INTRAVENOUS at 10:37

## 2021-03-14 RX ADMIN — MIDODRINE HYDROCHLORIDE 10 MG: 5 TABLET ORAL at 17:06

## 2021-03-14 RX ADMIN — OXYCODONE HYDROCHLORIDE 5 MG: 5 TABLET ORAL at 20:24

## 2021-03-14 RX ADMIN — POTASSIUM BICARBONATE 40 MEQ: 782 TABLET, EFFERVESCENT ORAL at 09:35

## 2021-03-14 RX ADMIN — FAMOTIDINE 20 MG: 10 INJECTION INTRAVENOUS at 09:34

## 2021-03-14 RX ADMIN — MIDODRINE HYDROCHLORIDE 10 MG: 5 TABLET ORAL at 08:30

## 2021-03-14 RX ADMIN — DIGOXIN 125 MCG: 125 TABLET ORAL at 09:34

## 2021-03-14 RX ADMIN — POTASSIUM & SODIUM PHOSPHATES POWDER PACK 280-160-250 MG 250 MG: 280-160-250 PACK at 17:06

## 2021-03-14 ASSESSMENT — PAIN SCALES - GENERAL
PAINLEVEL_OUTOF10: 6
PAINLEVEL_OUTOF10: 4
PAINLEVEL_OUTOF10: 0

## 2021-03-14 ASSESSMENT — PAIN DESCRIPTION - LOCATION: LOCATION: LEG

## 2021-03-14 ASSESSMENT — PAIN DESCRIPTION - PAIN TYPE: TYPE: ACUTE PAIN

## 2021-03-14 NOTE — PROGRESS NOTES
Pharmacy Note  Warfarin Consult follow-up      Recent Labs     03/14/21  0503   INR 2.2     Recent Labs     03/11/21  2215 03/12/21  1154 03/13/21  0832   HGB 10.1* 9.6* 8.5*   HCT 31.4* 29.8* 26.9*     --  151        Current warfarin drug-drug interactions: Lovenox 1mg/kg, acetaminophen    Date INR Dose   3/12/21 1.7 6 mg   3/13/21 1.8 6 mg   3/14/21 2.2 4 mg       Notes:  INR = 2.2. Will order 4 mg dose today, 3/14/21. Daily PT/INR while inpatient.      Danielle Strickland RP 3/14/2021 1:57 PM

## 2021-03-14 NOTE — PROGRESS NOTES
Division of Vascular Surgery         Progress Note      Name: Enma Hubbard  MRN: [id number]         Overnight Events:     No acute events    Subjective:     Seen and examined. Remains in A fib, rate controlled. Patient without complaints this AM.    Physical Exam:     Vitals:  BP (!) 85/46   Pulse 84   Temp 98 °F (36.7 °C) (Oral)   Resp 14   Ht 6' 2.02\" (1.88 m)   Wt 145 lb 8.1 oz (66 kg)   SpO2 97%   BMI 18.67 kg/m²     Physical Exam  Constitutional:       General: He is not in acute distress. HENT:      Head: Normocephalic and atraumatic. Mouth/Throat:      Mouth: Mucous membranes are moist.   Eyes:      Extraocular Movements: Extraocular movements intact. Pupils: Pupils are equal, round, and reactive to light. Neck:      Musculoskeletal: Normal range of motion. Cardiovascular:      Rate and Rhythm: Normal rate. Rhythm irregular. Comments: A fib  Pulmonary:      Effort: Pulmonary effort is normal. No respiratory distress. Abdominal:      General: There is no distension. Palpations: Abdomen is soft. Tenderness: There is no abdominal tenderness. Musculoskeletal:      Left lower leg: No edema. Comments: L fem palpable, dressing c/d/i, L DP signal, no mottling, motor intact LLE   Skin:     General: Skin is warm and dry. Neurological:      General: No focal deficit present. Mental Status: He is alert.          Assessment/Plan:     Pain controlled  Cards on board for a fib - controlled  Supplemental O2 as needed  General diet  Maintain ivory  Follow up AM labs  Replete lytes as needed  ID on board - rapid COVID positive, asx and also UTI on rocephin with resolved leukocytosis, follow up urine cx, so far non lactose fermenting gram negative rods  Coumadin - INR 2.2 today will dc lovenox bridge  OK for OOB to chair  Dispo planning - appreciate CM assistance, stable for discharge    Electronically signed by Douglas Gonzalez MD on 3/12/21 at 7:00 AM EST      [address] [facility] [address]: [phone]  C: [phone]  Email: [email or web address]

## 2021-03-15 ENCOUNTER — TELEPHONE (OUTPATIENT)
Dept: VASCULAR SURGERY | Age: 86
End: 2021-03-15

## 2021-03-15 VITALS
BODY MASS INDEX: 18.67 KG/M2 | RESPIRATION RATE: 14 BRPM | HEART RATE: 96 BPM | OXYGEN SATURATION: 100 % | WEIGHT: 145.5 LBS | DIASTOLIC BLOOD PRESSURE: 36 MMHG | SYSTOLIC BLOOD PRESSURE: 71 MMHG | HEIGHT: 74 IN | TEMPERATURE: 98.1 F

## 2021-03-15 LAB
INR BLD: 2.3
PROTHROMBIN TIME: 22.6 SEC (ref 9.1–12.3)

## 2021-03-15 PROCEDURE — 2500000003 HC RX 250 WO HCPCS: Performed by: STUDENT IN AN ORGANIZED HEALTH CARE EDUCATION/TRAINING PROGRAM

## 2021-03-15 PROCEDURE — 6360000002 HC RX W HCPCS: Performed by: STUDENT IN AN ORGANIZED HEALTH CARE EDUCATION/TRAINING PROGRAM

## 2021-03-15 PROCEDURE — 36415 COLL VENOUS BLD VENIPUNCTURE: CPT

## 2021-03-15 PROCEDURE — 6370000000 HC RX 637 (ALT 250 FOR IP): Performed by: STUDENT IN AN ORGANIZED HEALTH CARE EDUCATION/TRAINING PROGRAM

## 2021-03-15 PROCEDURE — 2700000000 HC OXYGEN THERAPY PER DAY

## 2021-03-15 PROCEDURE — 85610 PROTHROMBIN TIME: CPT

## 2021-03-15 PROCEDURE — 2580000003 HC RX 258: Performed by: STUDENT IN AN ORGANIZED HEALTH CARE EDUCATION/TRAINING PROGRAM

## 2021-03-15 PROCEDURE — 97535 SELF CARE MNGMENT TRAINING: CPT

## 2021-03-15 PROCEDURE — 94761 N-INVAS EAR/PLS OXIMETRY MLT: CPT

## 2021-03-15 RX ORDER — DIGOXIN 125 MCG
125 TABLET ORAL DAILY
Qty: 30 TABLET | Refills: 3 | Status: CANCELLED | OUTPATIENT
Start: 2021-03-16

## 2021-03-15 RX ORDER — MIDODRINE HYDROCHLORIDE 10 MG/1
10 TABLET ORAL
Qty: 90 TABLET | Refills: 3 | Status: CANCELLED | OUTPATIENT
Start: 2021-03-15

## 2021-03-15 RX ORDER — DIGOXIN 125 MCG
125 TABLET ORAL DAILY
Qty: 30 TABLET | Refills: 3 | DISCHARGE
Start: 2021-03-16

## 2021-03-15 RX ORDER — WARFARIN SODIUM 6 MG/1
6 TABLET ORAL
Qty: 30 TABLET | Refills: 3 | DISCHARGE
Start: 2021-03-15 | End: 2021-03-15

## 2021-03-15 RX ORDER — CEPHALEXIN 500 MG/1
500 CAPSULE ORAL 3 TIMES DAILY
Refills: 0 | DISCHARGE
Start: 2021-03-15 | End: 2021-03-17

## 2021-03-15 RX ORDER — MIDODRINE HYDROCHLORIDE 10 MG/1
10 TABLET ORAL
Qty: 90 TABLET | Refills: 3 | DISCHARGE
Start: 2021-03-15

## 2021-03-15 RX ORDER — WARFARIN SODIUM 3 MG/1
6 TABLET ORAL
Status: DISCONTINUED | OUTPATIENT
Start: 2021-03-15 | End: 2021-03-15 | Stop reason: HOSPADM

## 2021-03-15 RX ORDER — CEPHALEXIN 500 MG/1
500 CAPSULE ORAL 3 TIMES DAILY
Qty: 6 CAPSULE | Refills: 0 | Status: CANCELLED | OUTPATIENT
Start: 2021-03-15 | End: 2021-03-17

## 2021-03-15 RX ORDER — TAMSULOSIN HYDROCHLORIDE 0.4 MG/1
0.4 CAPSULE ORAL DAILY
Qty: 30 CAPSULE | Refills: 3 | DISCHARGE
Start: 2021-03-16

## 2021-03-15 RX ORDER — WARFARIN SODIUM 6 MG/1
6 TABLET ORAL
Qty: 30 TABLET | Refills: 3 | Status: CANCELLED | OUTPATIENT
Start: 2021-03-15 | End: 2021-03-15

## 2021-03-15 RX ADMIN — ACETAMINOPHEN 1000 MG: 500 TABLET ORAL at 05:33

## 2021-03-15 RX ADMIN — DIGOXIN 125 MCG: 125 TABLET ORAL at 07:55

## 2021-03-15 RX ADMIN — MIDODRINE HYDROCHLORIDE 10 MG: 5 TABLET ORAL at 07:56

## 2021-03-15 RX ADMIN — TAMSULOSIN HYDROCHLORIDE 0.4 MG: 0.4 CAPSULE ORAL at 07:55

## 2021-03-15 RX ADMIN — SODIUM CHLORIDE, PRESERVATIVE FREE 10 ML: 5 INJECTION INTRAVENOUS at 07:57

## 2021-03-15 RX ADMIN — METOPROLOL TARTRATE 5 MG: 1 INJECTION, SOLUTION INTRAVENOUS at 04:01

## 2021-03-15 RX ADMIN — FAMOTIDINE 20 MG: 10 INJECTION INTRAVENOUS at 07:55

## 2021-03-15 RX ADMIN — CEFTRIAXONE SODIUM 1000 MG: 1 INJECTION, POWDER, FOR SOLUTION INTRAMUSCULAR; INTRAVENOUS at 08:12

## 2021-03-15 NOTE — PROGRESS NOTES
Division of Vascular Surgery         Progress Note      Name: Parul Lund     MRN: [id number]         Overnight Events:     RVR overnight, refused daytime dig     Subjective:     Seen and examined. Remains in A fib, RVR this AM. BP stable. Reportedly refused dig. Physical Exam:     Vitals:  /65   Pulse 105   Temp 98.2 °F (36.8 °C) (Oral)   Resp 20   Ht 6' 2.02\" (1.88 m)   Wt 145 lb 8.1 oz (66 kg)   SpO2 95%   BMI 18.67 kg/m²     Physical Exam  Constitutional:       General: He is not in acute distress. HENT:      Head: Normocephalic and atraumatic. Mouth/Throat:      Mouth: Mucous membranes are moist.   Eyes:      Extraocular Movements: Extraocular movements intact. Pupils: Pupils are equal, round, and reactive to light. Neck:      Musculoskeletal: Normal range of motion. Cardiovascular:      Rate and Rhythm: Tachycardia present. Rhythm irregular. Comments: A fib  Pulmonary:      Effort: Pulmonary effort is normal. No respiratory distress. Abdominal:      General: There is no distension. Palpations: Abdomen is soft. Tenderness: There is no abdominal tenderness. Musculoskeletal:      Left lower leg: No edema. Comments: L fem palpable, dressing c/d/i, L DP signal, no mottling, motor intact LLE   Skin:     General: Skin is warm and dry. Neurological:      General: No focal deficit present. Mental Status: He is alert.          Assessment/Plan:     Pain controlled  Cards on board for a fib - RVR this AM, patient had refused dig yesterday  Supplemental O2 as needed  General diet  Maintain ivory  Follow up AM labs  Replete lytes as needed  ID on board - rapid COVID positive, asx and also UTI on rocephin with resolved leukocytosis, follow up urine cx, so far non lactose fermenting gram negative rods  Coumadin - INR 2.3 today  OK for OOB to chair  Dispo planning - appreciate CM assistance, stable for discharge    Electronically signed by Eduarda Murillo MD on 3/12/21 at 7:00 AM [address]: [phone]  C: [phone]  Email: [email or web address]

## 2021-03-15 NOTE — TELEPHONE ENCOUNTER
----- Message from Breann Steinberg MA sent at 3/15/2021  8:12 AM EDT -----  Scheduled   ----- Message -----  From: Ravi Meza MD  Sent: 3/13/2021   1:09 PM EDT  To: FELIX Simmons - NP, #    1 month postop s/p Left leg thrombectomy

## 2021-03-15 NOTE — PROGRESS NOTES
Pharmacy Note  Warfarin Consult follow-up      Recent Labs     03/15/21  0356   INR 2.3     Recent Labs     03/12/21  1154 03/13/21  0832   HGB 9.6* 8.5*   HCT 29.8* 26.9*   PLT  --  151       Significant Drug-Drug Interactions:  New warfarin drug-drug interactions: none  Discontinued drug-drug interactions: none  Current warfarin drug-drug interactions:      acetaminophen, ceftriaxone    Date INR Dose   3/12/21 1.7 6 mg   3/13/21 1.8 6 mg   3/14/21 2.2 4 mg   3/15/21 2.3 6 mg        Notes:        INR remains in therapeutic range. Warfarin 6 mg ordered per home dosing regimen. Daily PT/INR while inpatient. [address] [place] [address]  Ph., CACP, Clinical Pharmacist  Anticoagulation Services, [facility]  3/15/2021  7:53 AM

## 2021-03-15 NOTE — PROGRESS NOTES
Occupational Therapy  Facility/Department: [facility] 1  Daily Treatment Note  NAME: Marcie Tillman  : 1934  MRN: [id number]    Date of Service: 3/15/2021    Discharge Recommendations:  Patient would benefit from continued therapy after discharge in order to increase pt balance, activity tolerance and independence. Discussed with OTR to update goals. Assessment   Performance deficits / Impairments: Decreased functional mobility ; Decreased safe awareness;Decreased balance;Decreased ADL status; Decreased cognition;Decreased posture;Decreased endurance;Decreased high-level IADLs;Decreased strength  Treatment Diagnosis: PVD/Ischemic left leg  Prognosis: Good  OT Education: OT Role;Transfer Training;ADL Adaptive Strategies  Patient Education: proper hand and foot placement; balance maintaince; proper posture; 4 figure dressing technique  Barriers to Learning: pt demo F carry over  REQUIRES OT FOLLOW UP: Yes  Activity Tolerance  Activity Tolerance: Patient limited by fatigue;Patient Tolerated treatment well  Safety Devices  Safety Devices in place: Yes  Type of devices: Gait belt;Patient at risk for falls; Left in bed;Call light within reach; Bed alarm in place;Nurse notified  Restraints  Initially in place: No         Patient Diagnosis(es): There were no encounter diagnoses. has a past medical history of Atrial fibrillation ([facility] [address] Cancer ([facility] [address] and Pneumonia. has a past surgical history that includes Skin cancer excision; Prostate surgery; and femoral bypass (Left, 3/11/2021).     Restrictions  Restrictions/Precautions  Restrictions/Precautions: Up as Tolerated, Fall Risk  Required Braces or Orthoses?: No  Subjective   General  Chart Reviewed: Yes  Patient assessed for rehabilitation services?: Yes  Family / Caregiver Present: No  Vital Signs  Patient Currently in Pain: Denies   Orientation  Orientation  Overall Orientation Status: Impaired  Orientation Level: Oriented to person;Disoriented to place; Disoriented to situation(age not ;)  Objective    ADL  Grooming: Setup;Verbal cueing; Increased time to complete;Stand by assistance(face washing complete seated at EOB)  UE Bathing: Increased time to complete;Setup;Verbal cueing;Minimal assistance(req assist with back seated at EOB)  LE Bathing: Minimal assistance;Setup;Verbal cueing; Increased time to complete(pt able to wash proximal BLE from hips to shins req assist with BLE at feet sec to decrerased hip flexion and balance)  UE Dressing: Moderate assistance;Setup;Verbal cueing(to doff/don gown req assist threading over IV)  LE Dressing: Maximum assistance;Setup;Verbal cueing; Increased time to complete(to don socks)  Additional Comments: Pt supine in bed at start of session pleasant and cooperative. ADLs completed seated at EOB, pt req increased time and mod verbal/visual cues for sequencing. Xiao/post not completed during LBB sec to pt increased fatigue and decreased activity tolerance. Pt limited throughout session sec to decreased balance, posture, safety and flexion. Balance  Sitting Balance: Stand by assistance(increasing to CGA ynamic sitting with bathing BLE pt tolerated approx 35 min)  Standing Balance: Contact guard assistance  Standing Balance  Time: Pt tolerated approx 3-4 min  Activity: static standing  Comment: utilizing SS for 2 static standing attempts pt req cues for proper hand placement  Functional Mobility  Functional Mobility Comments: DELMAR sec to pt increased fatigue  Bed mobility  Supine to Sit: Minimal assistance(req assist with LLE)  Sit to Supine:  Moderate assistance(req assist with BLE)  Scooting: Contact guard assistance  Comment: HOB elevated utilizing bed rails  Transfers  Sit to stand: Minimal assistance;2 Person assistance  Stand to sit: Minimal assistance;2 Person assistance  Transfer Comments: utilizing SS req min assist with hand placement prior to stand attempt pt limited per fatigue and decreased activity tolerance Cognition  Overall Cognitive Status: Exceptions  Arousal/Alertness: Appropriate responses to stimuli  Following Commands: Follows multistep commands with repitition; Follows multistep commands with increased time; Inconsistently follows commands  Attention Span: Attends with cues to redirect  Memory: Decreased recall of biographical Information;Decreased recall of recent events  Safety Judgement: Decreased awareness of need for assistance;Decreased awareness of need for safety  Problem Solving: Decreased awareness of errors;Assistance required to identify errors made;Assistance required to correct errors made  Insights: Decreased awareness of deficits  Initiation: Requires cues for some  Sequencing: Requires cues for some     Pt pleasant and cooperative. ADL tasks of bathing, dressing and grooming completed see above for LOF. 0 LOB noted during sitting and standing. Pt demo forward flexed posture throughout dynamic sitting req cues throughout on proper posture, forward flexed posture increasing with fatigue. Education/demonstration provided on 4 figure dressing technique, pt demo F carry over with F hip flexion however limited per fatigue and decreased strength. At session end pt supine in bed with call light in reach, BLE elevated and bed alarm on. RN aware.    Plan   Plan  Times per week: 3-4 x week  Current Treatment Recommendations: Endurance Training, Strengthening, Patient/Caregiver Education & Training, Cognitive Reorientation, Self-Care / ADL, Functional Mobility Training, Safety Education & Training, Balance Training   Cont POC    Goals  Short term goals  Time Frame for Short term goals: Pt will, by discharge:  Short term goal 1: Dem Min a with UB adls  Short term goal 2: Dem min a for bed mobility  Short term goal 3: Sit on eob 15 min with good unsupported sitting balance  Short term goal 4: Dem min a for sit to stand transfer  Short term goal 5: Be alert and oriented x 2 in 2/4 session with verbal cuing as needed       Therapy Time   Individual Concurrent Group Co-treatment   Time In 0945         Time Out 1042         Minutes 57         Timed Code Treatment Minutes: [place], STONER/L

## 2021-03-15 NOTE — PROGRESS NOTES
[facility]   Progress Note                    Date:   3/15/2021  Patient name:  Marcie Tillman  Date of admission:  3/11/2021  3:14 PM  MRN:   [id number]  YOB: 1934  PCP:    No primary care provider on file. Reason for Admission:  LEFT LEG ISCHEMIA    Subjective:      Clinical Changes / Abnormalities: There were no acute events overnight, remained hemodynamically stable, denies chest pain, dyspnea, orthopnea or palpitations. Patient's blood pressure is on lower side. Patient refused central line. Holding off on pressors Patient was started on digoxin for rate control for atrial fibrillation. He has been in permanent a fib and continues to go in and out of RVR however he has refused digoxin. Urine output in the last 24 hours:     Intake/Output Summary (Last 24 hours) at 3/15/2021 0801  Last data filed at 3/15/2021 0400  Gross per 24 hour   Intake 480 ml   Output 1175 ml   Net -695 ml     I/O since admission: -190.2 cc    Medications:   Scheduled Meds:   cefTRIAXone (ROCEPHIN) IV  1,000 mg Intravenous Q24H    tamsulosin  0.4 mg Oral Daily    digoxin  125 mcg Oral Daily    midodrine  10 mg Oral TID WC    warfarin (COUMADIN) daily dosing (placeholder)   Other RX Placeholder    warfarin  6 mg Oral Once    sodium chloride flush  10 mL Intravenous 2 times per day    famotidine (PEPCID) injection  20 mg Intravenous BID    acetaminophen  1,000 mg Oral 3 times per day     Continuous Infusions:  CBC:   Recent Labs     03/12/21  1154 03/13/21  0832   WBC  --  8.8   HGB 9.6* 8.5*   PLT  --  151     BMP:    Recent Labs     03/13/21  0417 03/14/21  0503    136   K 3.8 3.8    105   CO2 23 23   BUN 13 14   CREATININE 0.66* 0.57*   GLUCOSE 106* 146*     Hepatic: No results for input(s): AST, ALT, ALB, BILITOT, ALKPHOS in the last 72 hours. Troponin: No results for input(s): TROPONINI in the last 72 hours. No results for input(s): TROPONINT in the last 72 hours.   BNP: No results for input(s): PROBNP in the last 72 hours. No results for input(s): BNP in the last 72 hours. Lipids: No results for input(s): CHOL, HDL in the last 72 hours. Invalid input(s): LDLCALCU  INR:   Recent Labs     03/13/21  0417 03/14/21  0503 03/15/21  0356   INR 1.8 2.2 2.3       Objective:   Vitals: BP (!) 71/36   Pulse 105   Temp 98.1 °F (36.7 °C) (Oral)   Resp 14   Ht 6' 2.02\" (1.88 m)   Wt 145 lb 8.1 oz (66 kg)   SpO2 95%   BMI 18.67 kg/m²    General appearance: awake, alert, in no apparent respiratory distress   HEENT: Head: Normocephalic, no lesions, without obvious abnormality  Neck: no JVD  Lungs: clear to auscultation bilaterally, no basilar rales, no wheezing   Heart: regular rate and rhythm, S1, S2 normal, no murmur, click, rub or gallop  Abdomen: soft, non-tender; bowel sounds normal  Extremities: No LE edema  Neurologic: Mental status: Alert, oriented. Motor and sensory not done.           Diagnostic Studies:     EKG:   A fib w/RVR      Assessment / Acute Cardiac Problems:   1. Permanent A fib   2. Acute limb ischemia s/p open thrombectomy of left iliac limb and aortic endograft & open thrombectomy of left external iliac and femoral arteries   3. AAA s/p EVAR  4. HLD  5. HTN    Plan of Treatment:   1. Currently off pressors. MAP around 50s. Patient refused central line. 2. Hold home Toprol XL dose while BP is low  3. Started on oral digoxin 125 mcg daily. Patient refused digoxin. 4. On coumadin. INR 2.3  5. K>4, Mg>2  6. Rest per primary team.    Brianna Richter MD  Fellow, [address] [place] [address]    Attending note,   Agree with the above evaluation and plan. No chest pain or SOB. Start Digoxin for better rate control. Continue coumadin. Will follow.

## 2021-03-15 NOTE — CARE COORDINATION
Spoke with Sari Bull at [facility], they can accept patient back today, I will arrange transportation    [phone] with Mery at [facility], transportation arranged for 12 noon . Sari Bull at [facility] updated. Patient updated, agreeable. Patient's wife Elfrieda Mutton called and updated.   Namita Almonte RN updated and given number for report    Discharge Report    [facility] Case Management Department  Written by: Rafy Gould RN    Patient Name: Khadar Curile  Attending Provider: Christin Cunningham, *  Admit Date: 3/11/2021  3:14 PM  MRN: [id number]  Account: [de-identified]                     : 1934  Discharge Date:  3/15/2021        Disposition: [facility] [address], RN

## 2021-03-18 NOTE — DISCHARGE SUMMARY
Vascular Surgery Discharge Summary    Patient Identification  Walter Hawkins is a 80 y.o. male     :  1934     Admit Date:  3/11/2021    Discharge date:   3/15/2021  3:05 PM                                   Disposition: SNF    Discharge Diagnoses:   Patient Active Problem List   Diagnosis    [facility]ipheral [facility]) [facility]    Severe malnutrition [facility]    Ischemic leg    Iliac artery occlusion, left [facility]    Status post abdominal aortic aneurysm (AAA) repair    Atrial fibrillation with rapid ventricular response (HCC)     Condition on discharge: Stable    Consults: SCC, Urology, ID, Cards    Surgery:   1) Redo exposure of left common femoral artery  2) Open thrombectomy of left iliac limb and aortic endograft  3) Open thrombectomy of left external iliac and femoral arteries  4) Aortogram, left lower extremity angiogram  5) Delayed placement of left iliac limb extension    Patient Instructions: Activity: as tolerated  Diet: As tolerated  Follow-up with Dr Ann Marie Gambino. See pre-printed instructions in chart and given to patient upon discharge.     Discharge Medications:      Narendra Patel Medication Instructions [id number]:[id number]    Printed on:21 0744   Medication Information                      ascorbic acid (VITAMIN C) 500 MG tablet  Take 500 mg by mouth daily             atorvastatin (LIPITOR) 20 MG tablet  Take 20 mg by mouth daily             digoxin (LANOXIN) 125 MCG tablet  Take 1 tablet by mouth daily             Magnesium Oxide (MAG- PO)  Take 400 mg by mouth daily              metoprolol succinate (TOPROL XL) 50 MG extended release tablet  Take 50 mg by mouth daily             midodrine (PROAMATINE) 10 MG tablet  Take 1 tablet by mouth 3 times daily (with meals)             mirtazapine (REMERON) 7.5 MG tablet  Take 7.5 mg by mouth nightly             potassium & sodium phosphates (PHOS-NAK) 280-160-250 MG PACK  Take 1 packet by mouth 3 times daily

## 2021-03-25 ENCOUNTER — TELEPHONE (OUTPATIENT)
Dept: VASCULAR SURGERY | Age: 86
End: 2021-03-25

## 2021-03-25 NOTE — TELEPHONE ENCOUNTER
Spoke to Chel Ballard the [address] [place] [address] worker at [facility]. She states that patients family would like to cancel his post op appointment at this time d/t [facility] being far away.  They [facility] up [facility]

## (undated) DEVICE — COVER,LIGHT HANDLE,FLX,2/PK: Brand: MEDLINE INDUSTRIES, INC.

## (undated) DEVICE — SUTURE VCRL SZ 3-0 L27IN ABSRB UD L26MM SH 1/2 CIR J416H

## (undated) DEVICE — C-ARM: Brand: UNBRANDED

## (undated) DEVICE — E-Z CLEAN, NON-STICK, PTFE COATED, ELECTROSURGICAL BLADE ELECTRODE, MODIFIED EXTENDED INSULATION, 2.5 INCH (6.35 CM): Brand: MEGADYNE

## (undated) DEVICE — GOWN,AURORA,NONREINFORCED,LARGE: Brand: MEDLINE

## (undated) DEVICE — DRAPE THER FLUID WARMING 66X44 IN FLAT SLUSH DBL DISC ORS

## (undated) DEVICE — CONTAINER,SPECIMEN,4OZ,OR STRL: Brand: MEDLINE

## (undated) DEVICE — DECANTER BAG 9": Brand: MEDLINE INDUSTRIES, INC.

## (undated) DEVICE — SYRINGE TB 1ML TRNSLUC BRL WHT PLUNG BLK MRK CONVENTIONAL

## (undated) DEVICE — 4F 80 CM LEMAITRE EMBOLECTOMY CATHETER: Brand: LEMAITRE EMBOLECTOMY CATHETER

## (undated) DEVICE — SVMMC VASC MAJ PK

## (undated) DEVICE — CATHETER FOL 2 W 12 FR 5 CC URETH SPEC TIEM BARDX IC

## (undated) DEVICE — SYRINGE MED 3ML CLR PLAS STD N CTRL LUERLOCK TIP DISP

## (undated) DEVICE — TUBING AMB

## (undated) DEVICE — SHEATH INTRO 12FR L28CM 0.035IN GWIRE HYDRPHLC LOK

## (undated) DEVICE — GOWN,SIRUS,POLYRNF,XLN/3XL,18/CS: Brand: MEDLINE

## (undated) DEVICE — GLOVE SURG SZ 7 CRM LTX FREE POLYISOPRENE POLYMER BEAD ANTI

## (undated) DEVICE — CONTAINER SPEC 33OZ URIN COLL BIODEGRADABLE PAPER DISP

## (undated) DEVICE — TOTAL TRAY, 16FR 10ML SIL FOLEY, URN: Brand: MEDLINE

## (undated) DEVICE — 3M™ IOBAN™ 2 ANTIMICROBIAL INCISE DRAPE 6651EZ: Brand: IOBAN™ 2

## (undated) DEVICE — SUTURE PROL SZ 5-0 L36IN NONABSORBABLE BLU L13MM C-1 3/8 8720H

## (undated) DEVICE — SUTURE PERMAHAND SZ 4-0 L18IN NONABSORBABLE BLK SILK BRAID A183H

## (undated) DEVICE — DRAPE,UTILITY,XL,4/PK,STERILE: Brand: MEDLINE

## (undated) DEVICE — GLOVE SURG SZ 75 CRM LTX FREE POLYISOPRENE POLYMER BEAD ANTI

## (undated) DEVICE — GLOVE SURG SZ 7 L12IN FNGR THK79MIL GRN LTX FREE

## (undated) DEVICE — APPLICATOR MEDICATED 26 CC SOLUTION HI LT ORNG CHLORAPREP

## (undated) DEVICE — BLADE ES ELASTOMERIC COAT INSUL DURABLE BEND UPTO 90DEG

## (undated) DEVICE — STRIP,CLOSURE,WOUND,MEDI-STRIP,1/2X4: Brand: MEDLINE

## (undated) DEVICE — APPLICATOR MEDICATED 10.5 CC SOLUTION HI LT ORNG CHLORAPREP

## (undated) DEVICE — GOWN,SIRUS,NONRNF,SETINSLV,XL,20/CS: Brand: MEDLINE

## (undated) DEVICE — GLOVE SURG SZ 8 CRM LTX FREE POLYISOPRENE POLYMER BEAD ANTI

## (undated) DEVICE — SYSTEM CATH 18GA L1.25IN OD1.27-1.3462MM ID0.9398-1.0160MM

## (undated) DEVICE — 4F 40CM OVER-THE-WIRE EMBOLECTOMY CATHETER, EIFU: Brand: LEMAITRE EMBOLECTOMY CATHETER

## (undated) DEVICE — CATHETER FOL 2 W 14 FR 5 CC URETH SPEC TIEM BARDX IC

## (undated) DEVICE — SUTURE VCRL + SZ 3-0 L27IN ABSRB UD L26MM SH 1/2 CIR VCP416H

## (undated) DEVICE — CATHETER ETER IV 18GA L125IN POLYUR STR RADPQ INTROCAN SFTY

## (undated) DEVICE — 5F 80 CM LEMAITRE EMBOLECTOMY CATHETER: Brand: LEMAITRE EMBOLECTOMY CATHETER

## (undated) DEVICE — 3M™ STERI-STRIP™ COMPOUND BENZOIN TINCTURE 40 BAGS/CARTON 4 CARTONS/CASE C1544: Brand: 3M™ STERI-STRIP™

## (undated) DEVICE — GLOVE SURG SZ 65 L12IN FNGR THK79MIL GRN LTX FREE

## (undated) DEVICE — SHEET, ORTHO, SPLIT, STERILE: Brand: MEDLINE

## (undated) DEVICE — GLOVE SURG SZ 65 CRM LTX FREE POLYISOPRENE POLYMER BEAD ANTI

## (undated) DEVICE — 5F PLUS 40CM OVER-THE-WIRE EMBOLECTOMY CATHETER: Brand: LEMAITRE EMBOLECTOMY CATHETER

## (undated) DEVICE — SMALL (GREEN) FOR GRAFTS UP TO 8 MM, 20 1/2" / 52 CM (1/PKG): Brand: SCANLAN® VASCULAR TUNNELER SHEATHS AND BULLET TIPS

## (undated) DEVICE — SYRINGE MED 10ML LUERLOCK TIP W/O SFTY DISP

## (undated) DEVICE — GAUZE,SPONGE,4"X4",16PLY,XRAY,STRL,LF: Brand: MEDLINE

## (undated) DEVICE — STRIP SKIN CLSR W1XL5IN NYL REINF CURAD

## (undated) DEVICE — DRESSING TRNSPAR W5XL4.5IN FLM SHT SEMIPERMEABLE WIND

## (undated) DEVICE — SUTURE PROL SZ 6-0 L24IN NONABSORBABLE BLU L9.3MM BV-1 3/8 8805H

## (undated) DEVICE — TOWEL,OR,DSP,ST,BLUE,DLX,XR,4/PK,20PK/CS: Brand: MEDLINE

## (undated) DEVICE — GAUZE,SPONGE,FLUFF,6"X6.75",STRL,5/TRAY: Brand: MEDLINE

## (undated) DEVICE — INTENDED FOR TISSUE SEPARATION, AND OTHER PROCEDURES THAT REQUIRE A SHARP SURGICAL BLADE TO PUNCTURE OR CUT.: Brand: BARD-PARKER ® CARBON RIB-BACK BLADES